# Patient Record
Sex: FEMALE | Race: WHITE | Employment: OTHER | URBAN - METROPOLITAN AREA
[De-identification: names, ages, dates, MRNs, and addresses within clinical notes are randomized per-mention and may not be internally consistent; named-entity substitution may affect disease eponyms.]

---

## 2020-01-24 ENCOUNTER — HOSPITAL ENCOUNTER (OUTPATIENT)
Age: 55
Discharge: HOME | End: 2020-01-24
Payer: COMMERCIAL

## 2020-01-24 VITALS
DIASTOLIC BLOOD PRESSURE: 77 MMHG | BODY MASS INDEX: 19.99 KG/M2 | WEIGHT: 120 LBS | OXYGEN SATURATION: 95 % | HEIGHT: 65 IN | TEMPERATURE: 98 F | HEART RATE: 97 BPM | RESPIRATION RATE: 20 BRPM | SYSTOLIC BLOOD PRESSURE: 119 MMHG

## 2020-01-24 DIAGNOSIS — J02.9 SORE THROAT: Primary | ICD-10-CM

## 2020-01-24 LAB — S PYO AG THROAT QL: NEGATIVE

## 2020-01-24 PROCEDURE — 99204 OFFICE O/P NEW MOD 45 MIN: CPT

## 2020-01-24 PROCEDURE — 87430 STREP A AG IA: CPT

## 2020-01-24 PROCEDURE — 99203 OFFICE O/P NEW LOW 30 MIN: CPT

## 2020-01-24 RX ORDER — AMOXICILLIN 875 MG/1
875 TABLET, COATED ORAL 2 TIMES DAILY
Qty: 20 TABLET | Refills: 0 | Status: SHIPPED | OUTPATIENT
Start: 2020-01-24 | End: 2020-02-03

## 2020-01-24 RX ORDER — DEXAMETHASONE 4 MG/1
10 TABLET ORAL ONCE
Status: COMPLETED | OUTPATIENT
Start: 2020-01-24 | End: 2020-01-24

## 2020-01-24 NOTE — ED PROVIDER NOTES
Patient presents with:  Sore Throat      HPI:     Oliver Parsons is a 53year old female with no significant past medical history presents with a chief complaint of sore throat, cough over the course of the last 3 days. Denies any fever or chills.   No post amoxicillin twice daily x10 days. We discussed supportive care and close follow-up. ER precautions given.   Patient verbalized plan of care and stated understanding      Orders Placed This Encounter      POCT Rapid Strep Once      POCT Rapid Strep      am

## 2020-10-12 ENCOUNTER — APPOINTMENT (OUTPATIENT)
Dept: CT IMAGING | Age: 55
DRG: 101 | End: 2020-10-12
Attending: EMERGENCY MEDICINE

## 2020-10-12 ENCOUNTER — HOSPITAL ENCOUNTER (INPATIENT)
Age: 55
LOS: 1 days | Discharge: HOME | DRG: 101 | End: 2020-10-13
Attending: EMERGENCY MEDICINE | Admitting: INTERNAL MEDICINE

## 2020-10-12 ENCOUNTER — APPOINTMENT (OUTPATIENT)
Dept: MRI IMAGING | Age: 55
DRG: 101 | End: 2020-10-12
Attending: PSYCHIATRY & NEUROLOGY

## 2020-10-12 ENCOUNTER — APPOINTMENT (OUTPATIENT)
Dept: NEUROLOGY | Age: 55
DRG: 101 | End: 2020-10-12
Attending: PSYCHIATRY & NEUROLOGY

## 2020-10-12 ENCOUNTER — APPOINTMENT (OUTPATIENT)
Dept: GENERAL RADIOLOGY | Age: 55
DRG: 101 | End: 2020-10-12
Attending: EMERGENCY MEDICINE

## 2020-10-12 DIAGNOSIS — R29.90 STROKE-LIKE EPISODE: Primary | ICD-10-CM

## 2020-10-12 LAB
ALBUMIN SERPL-MCNC: 3.7 G/DL (ref 3.6–5.1)
ALBUMIN/GLOB SERPL: 1 {RATIO} (ref 1–2.4)
ALP SERPL-CCNC: 94 UNITS/L (ref 45–117)
ALT SERPL-CCNC: 37 UNITS/L
ANION GAP SERPL CALC-SCNC: 13 MMOL/L (ref 10–20)
APTT PPP: 24 SEC (ref 22–32)
AST SERPL-CCNC: 64 UNITS/L
BASOPHILS # BLD: 0.1 K/MCL (ref 0–0.3)
BASOPHILS NFR BLD: 1 %
BILIRUB SERPL-MCNC: 0.5 MG/DL (ref 0.2–1)
BUN SERPL-MCNC: 9 MG/DL (ref 6–20)
BUN/CREAT SERPL: 17 (ref 7–25)
CALCIUM SERPL-MCNC: 8.9 MG/DL (ref 8.4–10.2)
CHLORIDE SERPL-SCNC: 101 MMOL/L (ref 98–107)
CO2 SERPL-SCNC: 26 MMOL/L (ref 21–32)
CREAT SERPL-MCNC: 0.53 MG/DL (ref 0.51–0.95)
DIFFERENTIAL METHOD BLD: ABNORMAL
EOSINOPHIL # BLD: 0 K/MCL (ref 0.1–0.5)
EOSINOPHIL NFR BLD: 1 %
ERYTHROCYTE [DISTWIDTH] IN BLOOD: 12.1 % (ref 11–15)
GLOBULIN SER-MCNC: 3.6 G/DL (ref 2–4)
GLUCOSE BLDC GLUCOMTR-MCNC: 102 MG/DL (ref 70–99)
GLUCOSE SERPL-MCNC: 141 MG/DL (ref 65–99)
HCT VFR BLD CALC: 39.3 % (ref 36–46.5)
HGB BLD-MCNC: 13.4 G/DL (ref 12–15.5)
IMM GRANULOCYTES # BLD AUTO: 0 K/MCL (ref 0–0.2)
IMM GRANULOCYTES NFR BLD: 0 %
INR PPP: 1
LYMPHOCYTES # BLD: 1.4 K/MCL (ref 1–4)
LYMPHOCYTES NFR BLD: 26 %
MCH RBC QN AUTO: 33.5 PG (ref 26–34)
MCHC RBC AUTO-ENTMCNC: 34.1 G/DL (ref 32–36.5)
MCV RBC AUTO: 98.3 FL (ref 78–100)
MONOCYTES # BLD: 0.6 K/MCL (ref 0.3–0.9)
MONOCYTES NFR BLD: 11 %
NEUTROPHILS # BLD: 3.2 K/MCL (ref 1.8–7.7)
NEUTROPHILS NFR BLD: 61 %
NRBC BLD MANUAL-RTO: 0 /100 WBC
PLATELET # BLD: 220 K/MCL (ref 140–450)
POTASSIUM SERPL-SCNC: 3.1 MMOL/L (ref 3.4–5.1)
POTASSIUM SERPL-SCNC: 3.2 MMOL/L (ref 3.4–5.1)
PROT SERPL-MCNC: 7.3 G/DL (ref 6.4–8.2)
PROTHROMBIN TIME: 10.6 SEC (ref 9.7–11.8)
RBC # BLD: 4 MIL/MCL (ref 4–5.2)
SARS-COV-2 RNA RESP QL NAA+PROBE: NOT DETECTED
SERVICE CMNT-IMP: NORMAL
SODIUM SERPL-SCNC: 137 MMOL/L (ref 135–145)
SPECIMEN SOURCE: NORMAL
TROPONIN I SERPL HS-MCNC: <0.02 NG/ML
WBC # BLD: 5.2 K/MCL (ref 4.2–11)

## 2020-10-12 PROCEDURE — 84132 ASSAY OF SERUM POTASSIUM: CPT

## 2020-10-12 PROCEDURE — 85610 PROTHROMBIN TIME: CPT

## 2020-10-12 PROCEDURE — 37195 THROMBOLYTIC THERAPY STROKE: CPT

## 2020-10-12 PROCEDURE — 10002800 HB RX 250 W HCPCS: Performed by: EMERGENCY MEDICINE

## 2020-10-12 PROCEDURE — 95713 VEEG 2-12 HR CONT MNTR: CPT

## 2020-10-12 PROCEDURE — 70450 CT HEAD/BRAIN W/O DYE: CPT

## 2020-10-12 PROCEDURE — 99285 EMERGENCY DEPT VISIT HI MDM: CPT

## 2020-10-12 PROCEDURE — 95700 EEG CONT REC W/VID EEG TECH: CPT

## 2020-10-12 PROCEDURE — 70553 MRI BRAIN STEM W/O & W/DYE: CPT

## 2020-10-12 PROCEDURE — U0003 INFECTIOUS AGENT DETECTION BY NUCLEIC ACID (DNA OR RNA); SEVERE ACUTE RESPIRATORY SYNDROME CORONAVIRUS 2 (SARS-COV-2) (CORONAVIRUS DISEASE [COVID-19]), AMPLIFIED PROBE TECHNIQUE, MAKING USE OF HIGH THROUGHPUT TECHNOLOGIES AS DESCRIBED BY CMS-2020-01-R: HCPCS

## 2020-10-12 PROCEDURE — 85025 COMPLETE CBC W/AUTO DIFF WBC: CPT

## 2020-10-12 PROCEDURE — 10002807 HB RX 258: Performed by: EMERGENCY MEDICINE

## 2020-10-12 PROCEDURE — 36415 COLL VENOUS BLD VENIPUNCTURE: CPT

## 2020-10-12 PROCEDURE — 10000008 HB ROOM CHARGE ICU OR CCU

## 2020-10-12 PROCEDURE — 84484 ASSAY OF TROPONIN QUANT: CPT

## 2020-10-12 PROCEDURE — 70496 CT ANGIOGRAPHY HEAD: CPT

## 2020-10-12 PROCEDURE — A9585 GADOBUTROL INJECTION: HCPCS | Performed by: PSYCHIATRY & NEUROLOGY

## 2020-10-12 PROCEDURE — 92610 EVALUATE SWALLOWING FUNCTION: CPT

## 2020-10-12 PROCEDURE — 80053 COMPREHEN METABOLIC PANEL: CPT

## 2020-10-12 PROCEDURE — 85730 THROMBOPLASTIN TIME PARTIAL: CPT

## 2020-10-12 PROCEDURE — 10002807 HB RX 258: Performed by: PSYCHIATRY & NEUROLOGY

## 2020-10-12 PROCEDURE — 3E03317 INTRODUCTION OF OTHER THROMBOLYTIC INTO PERIPHERAL VEIN, PERCUTANEOUS APPROACH: ICD-10-PCS | Performed by: EMERGENCY MEDICINE

## 2020-10-12 PROCEDURE — 70498 CT ANGIOGRAPHY NECK: CPT

## 2020-10-12 PROCEDURE — 10002805 HB CONTRAST AGENT: Performed by: EMERGENCY MEDICINE

## 2020-10-12 PROCEDURE — 93005 ELECTROCARDIOGRAM TRACING: CPT | Performed by: EMERGENCY MEDICINE

## 2020-10-12 PROCEDURE — 10002800 HB RX 250 W HCPCS: Performed by: PSYCHIATRY & NEUROLOGY

## 2020-10-12 PROCEDURE — 10002805 HB CONTRAST AGENT: Performed by: PSYCHIATRY & NEUROLOGY

## 2020-10-12 PROCEDURE — 71045 X-RAY EXAM CHEST 1 VIEW: CPT

## 2020-10-12 PROCEDURE — 10002803 HB RX 637: Performed by: NURSE PRACTITIONER

## 2020-10-12 PROCEDURE — 4A03X5D MEASUREMENT OF ARTERIAL FLOW, INTRACRANIAL, EXTERNAL APPROACH: ICD-10-PCS | Performed by: RADIOLOGY

## 2020-10-12 RX ORDER — BACILLUS COAGULANS/INULIN 1B-250 MG
CAPSULE ORAL
Status: ON HOLD | COMMUNITY
End: 2020-10-12

## 2020-10-12 RX ORDER — GADOBUTROL 604.72 MG/ML
7.5 INJECTION INTRAVENOUS ONCE
Status: COMPLETED | OUTPATIENT
Start: 2020-10-12 | End: 2020-10-12

## 2020-10-12 RX ORDER — PANTOPRAZOLE SODIUM 40 MG/1
40 TABLET, DELAYED RELEASE ORAL NIGHTLY
Status: DISCONTINUED | OUTPATIENT
Start: 2020-10-12 | End: 2020-10-13 | Stop reason: HOSPADM

## 2020-10-12 RX ORDER — SODIUM CHLORIDE 9 MG/ML
INJECTION, SOLUTION INTRAVENOUS
Status: DISPENSED
Start: 2020-10-12 | End: 2020-10-13

## 2020-10-12 RX ORDER — CHLORAL HYDRATE 500 MG
CAPSULE ORAL
Status: ON HOLD | COMMUNITY
End: 2020-10-12

## 2020-10-12 RX ORDER — LANOLIN ALCOHOL/MO/W.PET/CERES
400 CREAM (GRAM) TOPICAL 3 TIMES DAILY
Status: DISCONTINUED | OUTPATIENT
Start: 2020-10-13 | End: 2020-10-13 | Stop reason: HOSPADM

## 2020-10-12 RX ORDER — POTASSIUM CHLORIDE 20 MEQ/1
40 TABLET, EXTENDED RELEASE ORAL ONCE
Status: COMPLETED | OUTPATIENT
Start: 2020-10-12 | End: 2020-10-12

## 2020-10-12 RX ADMIN — IOHEXOL 100 ML: 350 INJECTION, SOLUTION INTRAVENOUS at 12:00

## 2020-10-12 RX ADMIN — ALTEPLASE 48.6 MG: KIT at 12:21

## 2020-10-12 RX ADMIN — THIAMINE HYDROCHLORIDE 400 MG: 100 INJECTION, SOLUTION INTRAMUSCULAR; INTRAVENOUS at 16:50

## 2020-10-12 RX ADMIN — ALTEPLASE 5.4 MG: KIT at 12:19

## 2020-10-12 RX ADMIN — GADOBUTROL 7.5 ML: 604.72 INJECTION INTRAVENOUS at 18:00

## 2020-10-12 RX ADMIN — PANTOPRAZOLE SODIUM 40 MG: 40 TABLET, DELAYED RELEASE ORAL at 20:30

## 2020-10-12 RX ADMIN — SODIUM CHLORIDE 100 ML: 0.9 INJECTION, SOLUTION INTRAVENOUS at 12:48

## 2020-10-12 RX ADMIN — POTASSIUM CHLORIDE 40 MEQ: 1500 TABLET, EXTENDED RELEASE ORAL at 18:37

## 2020-10-12 ASSESSMENT — COGNITIVE AND FUNCTIONAL STATUS - GENERAL
REMEMBERING WHERE THINGS ARE: A LITTLE
BECAUSE OF A PHYSICAL, MENTAL, OR EMOTIONAL CONDITION, DO YOU HAVE SERIOUS DIFFICULTY CONCENTRATING, REMEMBERING OR MAKING DECISIONS: NO
REMEMBERING TO TAKE MEDICATION: A LITTLE
APPLIED_COGNITIVE_CONVERTED_SCORE: 39.77
ARE YOU DEAF OR DO YOU HAVE SERIOUS DIFFICULTY  HEARING: NO
DO YOU HAVE SERIOUS DIFFICULTY WALKING OR CLIMBING STAIRS: NO
ARE YOU BLIND OR DO YOU HAVE SERIOUS DIFFICULTY SEEING, EVEN WHEN WEARING GLASSES: NO
TAKING CARE OF COMPLICATED TASKS: A LITTLE
DO YOU HAVE DIFFICULTY DRESSING OR BATHING: NO
REMEMBERING 5 ERRANDS WITH NO LIST: A LITTLE
UNDERSTANDING 10 TO 15 MIN SPEECH: A LITTLE
APPLIED_COGNITIVE_RAW_SCORE: 19
BECAUSE OF A PHYSICAL, MENTAL, OR EMOTIONAL CONDITION, DO YOU HAVE DIFFICULTY DOING ERRANDS ALONE: NO

## 2020-10-12 ASSESSMENT — ACTIVITIES OF DAILY LIVING (ADL)
EATING: INDEPENDENT
RECENT_DECLINE_ADL: NO
ADL_BEFORE_ADMISSION: INDEPENDENT
CHRONIC_PAIN_PRESENT: YES, CHRONIC
ADL_SCORE: 12
DESCRIBE HOW PAIN IMPACTS YOUR LIFE: NONE/CAN MANAGE
ADL_SHORT_OF_BREATH: NO

## 2020-10-12 ASSESSMENT — MOVEMENT AND STRENGTH ASSESSMENTS
LLE: EQUAL STRENGTH/TONE/MOVEMENT
LLE: EQUAL STRENGTH/TONE/MOVEMENT
RUE: EQUAL STRENGTH/TONE/MOVEMENT
RUE: EQUAL STRENGTH/TONE/MOVEMENT
FACE_JAW: FACE SYMMETRICAL
FACE_JAW: FACE SYMMETRICAL
LUE: EQUAL STRENGTH/TONE/MOVEMENT
RLE: EQUAL STRENGTH/TONE/MOVEMENT
HEAD_NECK: FULL RANGE OF MOTION
HEAD_NECK: FULL RANGE OF MOTION
LUE: EQUAL STRENGTH/TONE/MOVEMENT
FACE_JAW: FACE SYMMETRICAL
LLE: EQUAL STRENGTH/TONE/MOVEMENT
RUE: EQUAL STRENGTH/TONE/MOVEMENT
LUE: EQUAL STRENGTH/TONE/MOVEMENT
HEAD_NECK: FULL RANGE OF MOTION
RLE: EQUAL STRENGTH/TONE/MOVEMENT
RLE: EQUAL STRENGTH/TONE/MOVEMENT

## 2020-10-12 ASSESSMENT — LIFESTYLE VARIABLES
AUDIT-C TOTAL SCORE: 5
HOW MANY STANDARD DRINKS CONTAINING ALCOHOL DO YOU HAVE ON A TYPICAL DAY: 0,1 OR 2
HOW OFTEN DO YOU HAVE A DRINK CONTAINING ALCOHOL: 4 OR MORE TIMES PER WEEK
HOW OFTEN DO YOU HAVE 6 OR MORE DRINKS ON ONE OCCASION: LESS THAN MONTHLY

## 2020-10-12 ASSESSMENT — ENCOUNTER SYMPTOMS
COUGH: 0
ADENOPATHY: 0
NAUSEA: 0
LIGHT-HEADEDNESS: 0
DIARRHEA: 0
CHILLS: 0
ABDOMINAL PAIN: 0
DIZZINESS: 0
BACK PAIN: 0
SORE THROAT: 0
VOMITING: 0
SPEECH DIFFICULTY: 1
FEVER: 0
SHORTNESS OF BREATH: 0

## 2020-10-12 ASSESSMENT — COLUMBIA-SUICIDE SEVERITY RATING SCALE - C-SSRS
IS THE PATIENT ABLE TO COMPLETE C-SSRS: YES
1. WITHIN THE PAST MONTH, HAVE YOU WISHED YOU WERE DEAD OR WISHED YOU COULD GO TO SLEEP AND NOT WAKE UP?: NO
6. HAVE YOU EVER DONE ANYTHING, STARTED TO DO ANYTHING, OR PREPARED TO DO ANYTHING TO END YOUR LIFE?: NO
2. HAVE YOU ACTUALLY HAD ANY THOUGHTS OF KILLING YOURSELF?: NO

## 2020-10-12 ASSESSMENT — PAIN SCALES - GENERAL: PAINLEVEL_OUTOF10: 0

## 2020-10-13 ENCOUNTER — APPOINTMENT (OUTPATIENT)
Dept: CT IMAGING | Age: 55
DRG: 101 | End: 2020-10-13
Attending: PSYCHIATRY & NEUROLOGY

## 2020-10-13 VITALS
OXYGEN SATURATION: 99 % | SYSTOLIC BLOOD PRESSURE: 143 MMHG | BODY MASS INDEX: 20.09 KG/M2 | WEIGHT: 125 LBS | TEMPERATURE: 98.6 F | HEIGHT: 66 IN | RESPIRATION RATE: 26 BRPM | DIASTOLIC BLOOD PRESSURE: 84 MMHG | HEART RATE: 89 BPM

## 2020-10-13 LAB
ANION GAP SERPL CALC-SCNC: 10 MMOL/L (ref 10–20)
ATRIAL RATE (BPM): 82
BUN SERPL-MCNC: 8 MG/DL (ref 6–20)
BUN/CREAT SERPL: 14 (ref 7–25)
CALCIUM SERPL-MCNC: 8.9 MG/DL (ref 8.4–10.2)
CHLORIDE SERPL-SCNC: 104 MMOL/L (ref 98–107)
CHOLEST SERPL-MCNC: 287 MG/DL
CHOLEST/HDLC SERPL: 2.9 {RATIO}
CO2 SERPL-SCNC: 29 MMOL/L (ref 21–32)
CREAT SERPL-MCNC: 0.58 MG/DL (ref 0.51–0.95)
ERYTHROCYTE [DISTWIDTH] IN BLOOD: 12.2 % (ref 11–15)
GLUCOSE BLDC GLUCOMTR-MCNC: 92 MG/DL (ref 70–99)
GLUCOSE SERPL-MCNC: 87 MG/DL (ref 65–99)
HCT VFR BLD CALC: 42.1 % (ref 36–46.5)
HDLC SERPL-MCNC: 98 MG/DL
HGB BLD-MCNC: 14 G/DL (ref 12–15.5)
LDLC SERPL CALC-MCNC: 175 MG/DL
MAGNESIUM SERPL-MCNC: 1.8 MG/DL (ref 1.7–2.4)
MCH RBC QN AUTO: 33.6 PG (ref 26–34)
MCHC RBC AUTO-ENTMCNC: 33.3 G/DL (ref 32–36.5)
MCV RBC AUTO: 101 FL (ref 78–100)
NONHDLC SERPL-MCNC: 189 MG/DL
NRBC BLD MANUAL-RTO: 0 /100 WBC
P AXIS (DEGREES): 55
PLATELET # BLD: 198 K/MCL (ref 140–450)
POTASSIUM SERPL-SCNC: 3.9 MMOL/L (ref 3.4–5.1)
PR-INTERVAL (MSEC): 138
QRS-INTERVAL (MSEC): 82
QT-INTERVAL (MSEC): 416
QTC: 486
R AXIS (DEGREES): -11
RBC # BLD: 4.17 MIL/MCL (ref 4–5.2)
REPORT TEXT: NORMAL
SODIUM SERPL-SCNC: 139 MMOL/L (ref 135–145)
T AXIS (DEGREES): 25
TRIGL SERPL-MCNC: 72 MG/DL
VENTRICULAR RATE EKG/MIN (BPM): 82
WBC # BLD: 5 K/MCL (ref 4.2–11)

## 2020-10-13 PROCEDURE — 99254 IP/OBS CNSLTJ NEW/EST MOD 60: CPT | Performed by: FAMILY MEDICINE

## 2020-10-13 PROCEDURE — 80048 BASIC METABOLIC PNL TOTAL CA: CPT

## 2020-10-13 PROCEDURE — 70450 CT HEAD/BRAIN W/O DYE: CPT

## 2020-10-13 PROCEDURE — 10002803 HB RX 637: Performed by: EMERGENCY MEDICINE

## 2020-10-13 PROCEDURE — 10002800 HB RX 250 W HCPCS: Performed by: INTERNAL MEDICINE

## 2020-10-13 PROCEDURE — 10002803 HB RX 637: Performed by: PSYCHIATRY & NEUROLOGY

## 2020-10-13 PROCEDURE — 90686 IIV4 VACC NO PRSV 0.5 ML IM: CPT | Performed by: INTERNAL MEDICINE

## 2020-10-13 PROCEDURE — 97163 PT EVAL HIGH COMPLEX 45 MIN: CPT

## 2020-10-13 PROCEDURE — 85027 COMPLETE CBC AUTOMATED: CPT

## 2020-10-13 PROCEDURE — 83735 ASSAY OF MAGNESIUM: CPT

## 2020-10-13 PROCEDURE — 97166 OT EVAL MOD COMPLEX 45 MIN: CPT

## 2020-10-13 PROCEDURE — 36415 COLL VENOUS BLD VENIPUNCTURE: CPT

## 2020-10-13 PROCEDURE — 80061 LIPID PANEL: CPT

## 2020-10-13 PROCEDURE — 10002803 HB RX 637: Performed by: INTERNAL MEDICINE

## 2020-10-13 RX ORDER — POTASSIUM CHLORIDE 20 MEQ/1
40 TABLET, EXTENDED RELEASE ORAL ONCE
Status: COMPLETED | OUTPATIENT
Start: 2020-10-13 | End: 2020-10-13

## 2020-10-13 RX ORDER — LEVETIRACETAM 500 MG/1
500 TABLET ORAL EVERY 12 HOURS SCHEDULED
Qty: 90 TABLET | Refills: 1 | Status: SHIPPED | OUTPATIENT
Start: 2020-10-13 | End: 2022-02-02 | Stop reason: DRUGHIGH

## 2020-10-13 RX ORDER — LEVETIRACETAM 500 MG/1
500 TABLET ORAL EVERY 12 HOURS SCHEDULED
Status: DISCONTINUED | OUTPATIENT
Start: 2020-10-13 | End: 2020-10-13 | Stop reason: HOSPADM

## 2020-10-13 RX ADMIN — INFLUENZA A VIRUS A/GUANGDONG-MAONAN/SWL1536/2019 CNIC-1909 (H1N1) ANTIGEN (FORMALDEHYDE INACTIVATED), INFLUENZA A VIRUS A/HONG KONG/2671/2019 (H3N2) ANTIGEN (FORMALDEHYDE INACTIVATED), INFLUENZA B VIRUS B/PHUKET/3073/2013 ANTIGEN (FORMALDEHYDE INACTIVATED), AND INFLUENZA B VIRUS B/WASHINGTON/02/2019 ANTIGEN (FORMALDEHYDE INACTIVATED) 0.5 ML: 15; 15; 15; 15 INJECTION, SUSPENSION INTRAMUSCULAR at 12:50

## 2020-10-13 RX ADMIN — POTASSIUM CHLORIDE 40 MEQ: 1500 TABLET, EXTENDED RELEASE ORAL at 01:19

## 2020-10-13 RX ADMIN — Medication 400 MG: at 13:00

## 2020-10-13 RX ADMIN — Medication 400 MG: at 08:19

## 2020-10-13 RX ADMIN — LEVETIRACETAM 500 MG: 500 TABLET ORAL at 12:50

## 2020-10-13 RX ADMIN — POTASSIUM CHLORIDE 40 MEQ: 1500 TABLET, EXTENDED RELEASE ORAL at 08:19

## 2020-10-13 ASSESSMENT — COGNITIVE AND FUNCTIONAL STATUS - GENERAL
DAILY_ACTIVITY_RAW_SCORE: 24
DAILY_ACTIVITY_CONVERTED_SCORE: 57.54
BASIC_MOBILITY_RAW_SCORE: 20
BASIC_MOBILITY_CONVERTED_SCORE: 43.99

## 2020-10-13 ASSESSMENT — PAIN SCALES - GENERAL
PAINLEVEL_OUTOF10: 0
PAINLEVEL_OUTOF10: 2
PAINLEVEL_OUTOF10: 0

## 2020-10-13 ASSESSMENT — ACTIVITIES OF DAILY LIVING (ADL): PRIOR_ADL: INDEPENDENT

## 2022-02-02 ENCOUNTER — HOSPITAL ENCOUNTER (EMERGENCY)
Age: 57
Discharge: HOME | End: 2022-02-02
Attending: EMERGENCY MEDICINE

## 2022-02-02 ENCOUNTER — APPOINTMENT (OUTPATIENT)
Dept: GENERAL RADIOLOGY | Age: 57
End: 2022-02-02
Attending: EMERGENCY MEDICINE

## 2022-02-02 ENCOUNTER — APPOINTMENT (OUTPATIENT)
Dept: CT IMAGING | Age: 57
End: 2022-02-02
Attending: EMERGENCY MEDICINE

## 2022-02-02 ENCOUNTER — APPOINTMENT (OUTPATIENT)
Dept: NEUROLOGY | Age: 57
End: 2022-02-02
Attending: PSYCHIATRY & NEUROLOGY

## 2022-02-02 VITALS
DIASTOLIC BLOOD PRESSURE: 85 MMHG | BODY MASS INDEX: 20.92 KG/M2 | OXYGEN SATURATION: 99 % | WEIGHT: 129.63 LBS | RESPIRATION RATE: 15 BRPM | HEART RATE: 92 BPM | TEMPERATURE: 98.6 F | SYSTOLIC BLOOD PRESSURE: 132 MMHG

## 2022-02-02 DIAGNOSIS — R56.9 SEIZURE (CMD): Primary | ICD-10-CM

## 2022-02-02 LAB
ALBUMIN SERPL-MCNC: 3.8 G/DL (ref 3.6–5.1)
ALP SERPL-CCNC: 93 UNITS/L (ref 45–117)
ALT SERPL-CCNC: 64 UNITS/L
ANION GAP SERPL CALC-SCNC: 14 MMOL/L (ref 10–20)
APTT PPP: 22 SEC (ref 22–30)
AST SERPL-CCNC: 115 UNITS/L
ATRIAL RATE (BPM): 93
BASOPHILS # BLD: 0 K/MCL (ref 0–0.3)
BASOPHILS NFR BLD: 1 %
BILIRUB CONJ SERPL-MCNC: 0.2 MG/DL (ref 0–0.2)
BILIRUB SERPL-MCNC: 0.9 MG/DL (ref 0.2–1)
BUN SERPL-MCNC: 11 MG/DL (ref 6–20)
BUN/CREAT SERPL: 22 (ref 7–25)
CALCIUM SERPL-MCNC: 9.8 MG/DL (ref 8.4–10.2)
CHLORIDE SERPL-SCNC: 102 MMOL/L (ref 98–107)
CO2 SERPL-SCNC: 27 MMOL/L (ref 21–32)
CREAT SERPL-MCNC: 0.51 MG/DL (ref 0.51–0.95)
DEPRECATED RDW RBC: 51.1 FL (ref 39–50)
EOSINOPHIL # BLD: 0 K/MCL (ref 0–0.5)
EOSINOPHIL NFR BLD: 1 %
ERYTHROCYTE [DISTWIDTH] IN BLOOD: 13.5 % (ref 11–15)
FASTING DURATION TIME PATIENT: ABNORMAL H
FLUAV RNA RESP QL NAA+PROBE: NOT DETECTED
FLUBV RNA RESP QL NAA+PROBE: NOT DETECTED
GFR SERPLBLD BASED ON 1.73 SQ M-ARVRAT: >90 ML/MIN
GLUCOSE BLDC GLUCOMTR-MCNC: 119 MG/DL (ref 70–99)
GLUCOSE SERPL-MCNC: 113 MG/DL (ref 70–99)
HCT VFR BLD CALC: 43.6 % (ref 36–46.5)
HGB BLD-MCNC: 14.6 G/DL (ref 12–15.5)
IMM GRANULOCYTES # BLD AUTO: 0 K/MCL (ref 0–0.2)
IMM GRANULOCYTES # BLD: 0 %
INR PPP: 1.1
LIPASE SERPL-CCNC: 205 UNITS/L (ref 73–393)
LYMPHOCYTES # BLD: 1 K/MCL (ref 1–4)
LYMPHOCYTES NFR BLD: 22 %
MCH RBC QN AUTO: 34.1 PG (ref 26–34)
MCHC RBC AUTO-ENTMCNC: 33.5 G/DL (ref 32–36.5)
MCV RBC AUTO: 101.9 FL (ref 78–100)
MONOCYTES # BLD: 0.5 K/MCL (ref 0.3–0.9)
MONOCYTES NFR BLD: 11 %
NEUTROPHILS # BLD: 3 K/MCL (ref 1.8–7.7)
NEUTROPHILS NFR BLD: 65 %
NRBC BLD MANUAL-RTO: 0 /100 WBC
P AXIS (DEGREES): 50
PLATELET # BLD AUTO: 143 K/MCL (ref 140–450)
POTASSIUM SERPL-SCNC: 4.4 MMOL/L (ref 3.4–5.1)
PR-INTERVAL (MSEC): 136
PROT SERPL-MCNC: 8.3 G/DL (ref 6.4–8.2)
PROTHROMBIN TIME: 11.4 SEC (ref 9.7–11.8)
QRS-INTERVAL (MSEC): 80
QT-INTERVAL (MSEC): 368
QTC: 458
R AXIS (DEGREES): -5
RAINBOW EXTRA TUBES HOLD SPECIMEN: NORMAL
RBC # BLD: 4.28 MIL/MCL (ref 4–5.2)
REPORT TEXT: NORMAL
RSV AG NPH QL IA.RAPID: NOT DETECTED
SARS-COV-2 RNA RESP QL NAA+PROBE: NOT DETECTED
SERVICE CMNT-IMP: NORMAL
SERVICE CMNT-IMP: NORMAL
SODIUM SERPL-SCNC: 139 MMOL/L (ref 135–145)
T AXIS (DEGREES): 28
TROPONIN I SERPL DL<=0.01 NG/ML-MCNC: 5 NG/L
VENTRICULAR RATE EKG/MIN (BPM): 93
WBC # BLD: 4.6 K/MCL (ref 4.2–11)

## 2022-02-02 PROCEDURE — 93005 ELECTROCARDIOGRAM TRACING: CPT | Performed by: EMERGENCY MEDICINE

## 2022-02-02 PROCEDURE — 80048 BASIC METABOLIC PNL TOTAL CA: CPT | Performed by: EMERGENCY MEDICINE

## 2022-02-02 PROCEDURE — 85025 COMPLETE CBC W/AUTO DIFF WBC: CPT | Performed by: EMERGENCY MEDICINE

## 2022-02-02 PROCEDURE — C9803 HOPD COVID-19 SPEC COLLECT: HCPCS

## 2022-02-02 PROCEDURE — 99285 EMERGENCY DEPT VISIT HI MDM: CPT

## 2022-02-02 PROCEDURE — 85730 THROMBOPLASTIN TIME PARTIAL: CPT | Performed by: EMERGENCY MEDICINE

## 2022-02-02 PROCEDURE — 84484 ASSAY OF TROPONIN QUANT: CPT | Performed by: EMERGENCY MEDICINE

## 2022-02-02 PROCEDURE — 95816 EEG AWAKE AND DROWSY: CPT

## 2022-02-02 PROCEDURE — 71045 X-RAY EXAM CHEST 1 VIEW: CPT

## 2022-02-02 PROCEDURE — 96375 TX/PRO/DX INJ NEW DRUG ADDON: CPT

## 2022-02-02 PROCEDURE — 85610 PROTHROMBIN TIME: CPT | Performed by: EMERGENCY MEDICINE

## 2022-02-02 PROCEDURE — 80076 HEPATIC FUNCTION PANEL: CPT | Performed by: EMERGENCY MEDICINE

## 2022-02-02 PROCEDURE — 70450 CT HEAD/BRAIN W/O DYE: CPT

## 2022-02-02 PROCEDURE — 10002800 HB RX 250 W HCPCS: Performed by: EMERGENCY MEDICINE

## 2022-02-02 PROCEDURE — 82962 GLUCOSE BLOOD TEST: CPT

## 2022-02-02 PROCEDURE — 83690 ASSAY OF LIPASE: CPT | Performed by: EMERGENCY MEDICINE

## 2022-02-02 PROCEDURE — 96374 THER/PROPH/DIAG INJ IV PUSH: CPT

## 2022-02-02 PROCEDURE — 0241U COVID/FLU/RSV PANEL: CPT | Performed by: EMERGENCY MEDICINE

## 2022-02-02 RX ORDER — LEVETIRACETAM 1000 MG/1
1000 TABLET ORAL 2 TIMES DAILY
Qty: 180 TABLET | Refills: 3 | Status: SHIPPED | OUTPATIENT
Start: 2022-02-02

## 2022-02-02 RX ORDER — LEVETIRACETAM 500 MG/5ML
1000 INJECTION, SOLUTION, CONCENTRATE INTRAVENOUS ONCE
Status: COMPLETED | OUTPATIENT
Start: 2022-02-02 | End: 2022-02-02

## 2022-02-02 RX ORDER — LORAZEPAM 2 MG/ML
2 INJECTION INTRAMUSCULAR ONCE
Status: COMPLETED | OUTPATIENT
Start: 2022-02-02 | End: 2022-02-02

## 2022-02-02 RX ADMIN — LEVETIRACETAM 1000 MG: 100 INJECTION, SOLUTION INTRAVENOUS at 12:54

## 2022-02-02 RX ADMIN — LORAZEPAM 2 MG: 2 INJECTION INTRAMUSCULAR; INTRAVENOUS at 12:36

## 2022-02-02 ASSESSMENT — ENCOUNTER SYMPTOMS
NAUSEA: 0
NUMBNESS: 0
SPEECH DIFFICULTY: 1
DIARRHEA: 0
SHORTNESS OF BREATH: 0
HEADACHES: 0
WEAKNESS: 0
ABDOMINAL PAIN: 0
VOMITING: 0
CHILLS: 0
COUGH: 0
WHEEZING: 0
HALLUCINATIONS: 0
SORE THROAT: 0
DIZZINESS: 0
CONFUSION: 1
TREMORS: 1
EYE PAIN: 0
FEVER: 0

## 2022-12-28 ENCOUNTER — APPOINTMENT (OUTPATIENT)
Dept: GENERAL RADIOLOGY | Age: 57
DRG: 872 | End: 2022-12-28
Attending: EMERGENCY MEDICINE

## 2022-12-28 ENCOUNTER — HOSPITAL ENCOUNTER (INPATIENT)
Age: 57
LOS: 2 days | Discharge: HOME | DRG: 872 | End: 2022-12-30
Attending: EMERGENCY MEDICINE | Admitting: FAMILY MEDICINE

## 2022-12-28 ENCOUNTER — APPOINTMENT (OUTPATIENT)
Dept: CT IMAGING | Age: 57
DRG: 872 | End: 2022-12-28
Attending: EMERGENCY MEDICINE

## 2022-12-28 DIAGNOSIS — A41.9 SEPSIS WITHOUT ACUTE ORGAN DYSFUNCTION, DUE TO UNSPECIFIED ORGANISM (CMD): Primary | ICD-10-CM

## 2022-12-28 DIAGNOSIS — N39.0 URINARY TRACT INFECTION WITHOUT HEMATURIA, SITE UNSPECIFIED: ICD-10-CM

## 2022-12-28 DIAGNOSIS — G40.909 SEIZURE DISORDER (CMD): ICD-10-CM

## 2022-12-28 LAB
ALBUMIN SERPL-MCNC: 3.6 G/DL (ref 3.6–5.1)
ALBUMIN/GLOB SERPL: 0.6 {RATIO} (ref 1–2.4)
ALP SERPL-CCNC: 142 UNITS/L (ref 45–117)
ALT SERPL-CCNC: 19 UNITS/L
ANION GAP SERPL CALC-SCNC: 19 MMOL/L (ref 7–19)
APPEARANCE UR: ABNORMAL
APTT PPP: 30 SEC (ref 22–30)
AST SERPL-CCNC: 27 UNITS/L
ATRIAL RATE (BPM): 102
BACTERIA #/AREA URNS HPF: ABNORMAL /HPF
BASOPHILS # BLD: 0.1 K/MCL (ref 0–0.3)
BASOPHILS NFR BLD: 1 %
BILIRUB SERPL-MCNC: 1.4 MG/DL (ref 0.2–1)
BILIRUB UR QL STRIP: NEGATIVE
BUN SERPL-MCNC: 22 MG/DL (ref 6–20)
BUN/CREAT SERPL: 26 (ref 7–25)
CALCIUM SERPL-MCNC: 9.7 MG/DL (ref 8.4–10.2)
CHLORIDE SERPL-SCNC: 86 MMOL/L (ref 97–110)
CO2 SERPL-SCNC: 30 MMOL/L (ref 21–32)
COLOR UR: YELLOW
CREAT SERPL-MCNC: 0.86 MG/DL (ref 0.51–0.95)
DEPRECATED RDW RBC: 45.3 FL (ref 39–50)
EOSINOPHIL # BLD: 0.5 K/MCL (ref 0–0.5)
EOSINOPHIL NFR BLD: 5 %
ERYTHROCYTE [DISTWIDTH] IN BLOOD: 12.6 % (ref 11–15)
FASTING DURATION TIME PATIENT: ABNORMAL H
FLUAV RNA RESP QL NAA+PROBE: NOT DETECTED
FLUBV RNA RESP QL NAA+PROBE: NOT DETECTED
GFR SERPLBLD BASED ON 1.73 SQ M-ARVRAT: 78 ML/MIN
GLOBULIN SER-MCNC: 5.6 G/DL (ref 2–4)
GLUCOSE BLDC GLUCOMTR-MCNC: 150 MG/DL (ref 70–99)
GLUCOSE SERPL-MCNC: 151 MG/DL (ref 70–99)
GLUCOSE UR STRIP-MCNC: NEGATIVE MG/DL
HCT VFR BLD CALC: 45.6 % (ref 36–46.5)
HGB BLD-MCNC: 15.4 G/DL (ref 12–15.5)
HGB UR QL STRIP: ABNORMAL
HYALINE CASTS #/AREA URNS LPF: ABNORMAL /LPF
IMM GRANULOCYTES # BLD AUTO: 0 K/MCL (ref 0–0.2)
IMM GRANULOCYTES # BLD: 0 %
INR PPP: 1.1
KETONES UR STRIP-MCNC: 15 MG/DL
LACTATE BLDV-SCNC: 1.3 MMOL/L (ref 0–2)
LACTATE BLDV-SCNC: 4 MMOL/L (ref 0–2)
LEUKOCYTE ESTERASE UR QL STRIP: ABNORMAL
LIPASE SERPL-CCNC: 206 UNITS/L (ref 73–393)
LYMPHOCYTES # BLD: 1 K/MCL (ref 1–4)
LYMPHOCYTES NFR BLD: 9 %
MCH RBC QN AUTO: 32.6 PG (ref 26–34)
MCHC RBC AUTO-ENTMCNC: 33.8 G/DL (ref 32–36.5)
MCV RBC AUTO: 96.4 FL (ref 78–100)
MONOCYTES # BLD: 0.8 K/MCL (ref 0.3–0.9)
MONOCYTES NFR BLD: 7 %
NEUTROPHILS # BLD: 8.8 K/MCL (ref 1.8–7.7)
NEUTROPHILS NFR BLD: 78 %
NITRITE UR QL STRIP: NEGATIVE
NRBC BLD MANUAL-RTO: 0 /100 WBC
P AXIS (DEGREES): 57
PH UR STRIP: 6 [PH] (ref 5–7)
PLATELET # BLD AUTO: 144 K/MCL (ref 140–450)
POTASSIUM SERPL-SCNC: 3.5 MMOL/L (ref 3.4–5.1)
POTASSIUM SERPL-SCNC: 3.5 MMOL/L (ref 3.4–5.1)
PR-INTERVAL (MSEC): 128
PROCALCITONIN SERPL IA-MCNC: 2.85 NG/ML
PROT SERPL-MCNC: 9.2 G/DL (ref 6.4–8.2)
PROT UR STRIP-MCNC: 300 MG/DL
PROTHROMBIN TIME: 10.9 SEC (ref 9.7–11.8)
QRS-INTERVAL (MSEC): 82
QT-INTERVAL (MSEC): 344
QTC: 448
R AXIS (DEGREES): 4
RAINBOW EXTRA TUBES HOLD SPECIMEN: NORMAL
RAINBOW EXTRA TUBES HOLD SPECIMEN: NORMAL
RBC # BLD: 4.73 MIL/MCL (ref 4–5.2)
RBC #/AREA URNS HPF: ABNORMAL /HPF
REPORT TEXT: NORMAL
RSV AG NPH QL IA.RAPID: NOT DETECTED
SARS-COV-2 RNA RESP QL NAA+PROBE: NOT DETECTED
SERVICE CMNT-IMP: NORMAL
SERVICE CMNT-IMP: NORMAL
SODIUM SERPL-SCNC: 131 MMOL/L (ref 135–145)
SP GR UR STRIP: 1.02 (ref 1–1.03)
SQUAMOUS #/AREA URNS HPF: ABNORMAL /HPF
T AXIS (DEGREES): 45
UROBILINOGEN UR STRIP-MCNC: 4 MG/DL
VENTRICULAR RATE EKG/MIN (BPM): 102
WBC # BLD: 11.1 K/MCL (ref 4.2–11)
WBC #/AREA URNS HPF: >100 /HPF

## 2022-12-28 PROCEDURE — 87077 CULTURE AEROBIC IDENTIFY: CPT | Performed by: EMERGENCY MEDICINE

## 2022-12-28 PROCEDURE — 0241U COVID/FLU/RSV PANEL: CPT | Performed by: EMERGENCY MEDICINE

## 2022-12-28 PROCEDURE — 83690 ASSAY OF LIPASE: CPT | Performed by: EMERGENCY MEDICINE

## 2022-12-28 PROCEDURE — 99285 EMERGENCY DEPT VISIT HI MDM: CPT

## 2022-12-28 PROCEDURE — 36415 COLL VENOUS BLD VENIPUNCTURE: CPT | Performed by: FAMILY MEDICINE

## 2022-12-28 PROCEDURE — 10002805 HB CONTRAST AGENT: Performed by: EMERGENCY MEDICINE

## 2022-12-28 PROCEDURE — 10000002 HB ROOM CHARGE MED SURG

## 2022-12-28 PROCEDURE — 84132 ASSAY OF SERUM POTASSIUM: CPT | Performed by: FAMILY MEDICINE

## 2022-12-28 PROCEDURE — 87186 SC STD MICRODIL/AGAR DIL: CPT | Performed by: EMERGENCY MEDICINE

## 2022-12-28 PROCEDURE — 10002800 HB RX 250 W HCPCS: Performed by: EMERGENCY MEDICINE

## 2022-12-28 PROCEDURE — 10002807 HB RX 258: Performed by: FAMILY MEDICINE

## 2022-12-28 PROCEDURE — 80053 COMPREHEN METABOLIC PANEL: CPT | Performed by: EMERGENCY MEDICINE

## 2022-12-28 PROCEDURE — 10002807 HB RX 258: Performed by: EMERGENCY MEDICINE

## 2022-12-28 PROCEDURE — 84145 PROCALCITONIN (PCT): CPT | Performed by: EMERGENCY MEDICINE

## 2022-12-28 PROCEDURE — 82962 GLUCOSE BLOOD TEST: CPT

## 2022-12-28 PROCEDURE — 71045 X-RAY EXAM CHEST 1 VIEW: CPT

## 2022-12-28 PROCEDURE — 93005 ELECTROCARDIOGRAM TRACING: CPT | Performed by: EMERGENCY MEDICINE

## 2022-12-28 PROCEDURE — 96361 HYDRATE IV INFUSION ADD-ON: CPT

## 2022-12-28 PROCEDURE — 83605 ASSAY OF LACTIC ACID: CPT | Performed by: EMERGENCY MEDICINE

## 2022-12-28 PROCEDURE — 99223 1ST HOSP IP/OBS HIGH 75: CPT | Performed by: FAMILY MEDICINE

## 2022-12-28 PROCEDURE — 10002801 HB RX 250 W/O HCPCS: Performed by: EMERGENCY MEDICINE

## 2022-12-28 PROCEDURE — G1004 CDSM NDSC: HCPCS

## 2022-12-28 PROCEDURE — C9803 HOPD COVID-19 SPEC COLLECT: HCPCS

## 2022-12-28 PROCEDURE — 87154 CUL TYP ID BLD PTHGN 6+ TRGT: CPT | Performed by: EMERGENCY MEDICINE

## 2022-12-28 PROCEDURE — 74177 CT ABD & PELVIS W/CONTRAST: CPT

## 2022-12-28 PROCEDURE — 85730 THROMBOPLASTIN TIME PARTIAL: CPT | Performed by: EMERGENCY MEDICINE

## 2022-12-28 PROCEDURE — 10002803 HB RX 637: Performed by: FAMILY MEDICINE

## 2022-12-28 PROCEDURE — 85610 PROTHROMBIN TIME: CPT | Performed by: EMERGENCY MEDICINE

## 2022-12-28 PROCEDURE — 85025 COMPLETE CBC W/AUTO DIFF WBC: CPT | Performed by: EMERGENCY MEDICINE

## 2022-12-28 PROCEDURE — 81001 URINALYSIS AUTO W/SCOPE: CPT | Performed by: EMERGENCY MEDICINE

## 2022-12-28 PROCEDURE — 10004651 HB RX, NO CHARGE ITEM: Performed by: EMERGENCY MEDICINE

## 2022-12-28 PROCEDURE — 96365 THER/PROPH/DIAG IV INF INIT: CPT

## 2022-12-28 PROCEDURE — 96375 TX/PRO/DX INJ NEW DRUG ADDON: CPT

## 2022-12-28 PROCEDURE — 10004651 HB RX, NO CHARGE ITEM: Performed by: FAMILY MEDICINE

## 2022-12-28 RX ORDER — ATORVASTATIN CALCIUM 20 MG/1
20 TABLET, FILM COATED ORAL AT BEDTIME
Status: DISCONTINUED | OUTPATIENT
Start: 2022-12-28 | End: 2022-12-30 | Stop reason: HOSPADM

## 2022-12-28 RX ORDER — ACETAMINOPHEN 325 MG/1
650 TABLET ORAL EVERY 4 HOURS PRN
Status: DISCONTINUED | OUTPATIENT
Start: 2022-12-28 | End: 2022-12-30 | Stop reason: HOSPADM

## 2022-12-28 RX ORDER — POTASSIUM CHLORIDE 20 MEQ/1
40 TABLET, EXTENDED RELEASE ORAL ONCE
Status: COMPLETED | OUTPATIENT
Start: 2022-12-29 | End: 2022-12-28

## 2022-12-28 RX ORDER — 0.9 % SODIUM CHLORIDE 0.9 %
2 VIAL (ML) INJECTION EVERY 12 HOURS SCHEDULED
Status: DISCONTINUED | OUTPATIENT
Start: 2022-12-28 | End: 2022-12-30 | Stop reason: HOSPADM

## 2022-12-28 RX ORDER — LEVETIRACETAM 500 MG/5ML
1000 INJECTION, SOLUTION, CONCENTRATE INTRAVENOUS ONCE
Status: COMPLETED | OUTPATIENT
Start: 2022-12-28 | End: 2022-12-28

## 2022-12-28 RX ORDER — ATORVASTATIN CALCIUM 20 MG/1
20 TABLET, FILM COATED ORAL AT BEDTIME
COMMUNITY

## 2022-12-28 RX ORDER — CEFAZOLIN SODIUM/WATER 2 G/20 ML
2000 SYRINGE (ML) INTRAVENOUS ONCE
Status: COMPLETED | OUTPATIENT
Start: 2022-12-28 | End: 2022-12-28

## 2022-12-28 RX ORDER — LEVETIRACETAM 500 MG/1
1000 TABLET ORAL 2 TIMES DAILY
Status: DISCONTINUED | OUTPATIENT
Start: 2022-12-28 | End: 2022-12-30 | Stop reason: HOSPADM

## 2022-12-28 RX ORDER — SODIUM CHLORIDE 9 MG/ML
INJECTION, SOLUTION INTRAVENOUS CONTINUOUS
Status: DISCONTINUED | OUTPATIENT
Start: 2022-12-28 | End: 2022-12-30 | Stop reason: HOSPADM

## 2022-12-28 RX ORDER — CEFAZOLIN SODIUM/WATER 2 G/20 ML
2000 SYRINGE (ML) INTRAVENOUS DAILY
Status: DISCONTINUED | OUTPATIENT
Start: 2022-12-29 | End: 2022-12-28 | Stop reason: DRUGHIGH

## 2022-12-28 RX ORDER — ONDANSETRON 2 MG/ML
4 INJECTION INTRAMUSCULAR; INTRAVENOUS 2 TIMES DAILY PRN
Status: DISCONTINUED | OUTPATIENT
Start: 2022-12-28 | End: 2022-12-30 | Stop reason: HOSPADM

## 2022-12-28 RX ORDER — CEFAZOLIN SODIUM/WATER 1 G/10 ML
1000 SYRINGE (ML) INTRAVENOUS DAILY
Status: DISCONTINUED | OUTPATIENT
Start: 2022-12-29 | End: 2022-12-30 | Stop reason: HOSPADM

## 2022-12-28 RX ORDER — HYDROCODONE BITARTRATE AND ACETAMINOPHEN 5; 325 MG/1; MG/1
1 TABLET ORAL EVERY 4 HOURS PRN
Status: DISCONTINUED | OUTPATIENT
Start: 2022-12-28 | End: 2022-12-30 | Stop reason: HOSPADM

## 2022-12-28 RX ORDER — FOLIC ACID 0.8 MG
500 TABLET ORAL DAILY
COMMUNITY

## 2022-12-28 RX ORDER — POTASSIUM CHLORIDE 20 MEQ/1
40 TABLET, EXTENDED RELEASE ORAL ONCE
Status: COMPLETED | OUTPATIENT
Start: 2022-12-28 | End: 2022-12-28

## 2022-12-28 RX ORDER — ACETAMINOPHEN 500 MG
1000 TABLET ORAL ONCE
Status: COMPLETED | OUTPATIENT
Start: 2022-12-28 | End: 2022-12-28

## 2022-12-28 RX ORDER — ONDANSETRON 2 MG/ML
4 INJECTION INTRAMUSCULAR; INTRAVENOUS ONCE
Status: COMPLETED | OUTPATIENT
Start: 2022-12-28 | End: 2022-12-28

## 2022-12-28 RX ADMIN — POTASSIUM CHLORIDE 40 MEQ: 1500 TABLET, EXTENDED RELEASE ORAL at 23:58

## 2022-12-28 RX ADMIN — SODIUM CHLORIDE 25 ML: 9 INJECTION, SOLUTION INTRAVENOUS at 11:13

## 2022-12-28 RX ADMIN — ONDANSETRON 4 MG: 2 INJECTION INTRAMUSCULAR; INTRAVENOUS at 14:54

## 2022-12-28 RX ADMIN — METRONIDAZOLE 500 MG: 500 INJECTION, SOLUTION INTRAVENOUS at 21:29

## 2022-12-28 RX ADMIN — SODIUM CHLORIDE 1000 ML: 9 INJECTION, SOLUTION INTRAVENOUS at 11:45

## 2022-12-28 RX ADMIN — LEVETIRACETAM 1000 MG: 100 INJECTION, SOLUTION INTRAVENOUS at 12:05

## 2022-12-28 RX ADMIN — ACETAMINOPHEN 1000 MG: 500 TABLET ORAL at 11:11

## 2022-12-28 RX ADMIN — IOHEXOL 80 ML: 350 INJECTION, SOLUTION INTRAVENOUS at 12:46

## 2022-12-28 RX ADMIN — APIXABAN 5 MG: 5 TABLET, FILM COATED ORAL at 21:26

## 2022-12-28 RX ADMIN — METRONIDAZOLE 500 MG: 500 INJECTION, SOLUTION INTRAVENOUS at 12:35

## 2022-12-28 RX ADMIN — CEFTRIAXONE SODIUM 2000 MG: 10 INJECTION, POWDER, FOR SOLUTION INTRAVENOUS at 12:08

## 2022-12-28 RX ADMIN — ACETAMINOPHEN 650 MG: 325 TABLET ORAL at 16:38

## 2022-12-28 RX ADMIN — SODIUM CHLORIDE: 9 INJECTION, SOLUTION INTRAVENOUS at 16:31

## 2022-12-28 RX ADMIN — SODIUM CHLORIDE 1000 ML: 9 INJECTION, SOLUTION INTRAVENOUS at 11:14

## 2022-12-28 RX ADMIN — POTASSIUM CHLORIDE 40 MEQ: 1500 TABLET, EXTENDED RELEASE ORAL at 18:06

## 2022-12-28 RX ADMIN — SODIUM CHLORIDE, PRESERVATIVE FREE 2 ML: 5 INJECTION INTRAVENOUS at 11:14

## 2022-12-28 RX ADMIN — LEVETIRACETAM 1000 MG: 500 TABLET, FILM COATED ORAL at 21:26

## 2022-12-28 RX ADMIN — ATORVASTATIN CALCIUM 20 MG: 20 TABLET, FILM COATED ORAL at 21:26

## 2022-12-28 SDOH — SOCIAL STABILITY: SOCIAL NETWORK: SUPPORT SYSTEMS: SPOUSE/SIGNIFICANT OTHER;CHILDREN

## 2022-12-28 SDOH — ECONOMIC STABILITY: HOUSING INSECURITY: WHAT IS YOUR LIVING SITUATION TODAY?: SPOUSE;CHILDREN

## 2022-12-28 SDOH — ECONOMIC STABILITY: FOOD INSECURITY: HOW OFTEN IN THE PAST 12 MONTHS WERE YOU WORRIED OR STRESSED ABOUT HAVING ENOUGH MONEY TO BUY NUTRITIOUS MEALS?: NEVER

## 2022-12-28 SDOH — ECONOMIC STABILITY: TRANSPORTATION INSECURITY
IN THE PAST 12 MONTHS, HAS LACK OF TRANSPORTATION KEPT YOU FROM MEETINGS, WORK, OR FROM GETTING THINGS NEEDED FOR DAILY LIVING?: NO

## 2022-12-28 SDOH — HEALTH STABILITY: PHYSICAL HEALTH: DO YOU HAVE DIFFICULTY DRESSING OR BATHING?: NO

## 2022-12-28 SDOH — HEALTH STABILITY: GENERAL
BECAUSE OF A PHYSICAL, MENTAL, OR EMOTIONAL CONDITION, DO YOU HAVE SERIOUS DIFFICULTY CONCENTRATING, REMEMBERING OR MAKING DECISIONS?: NO

## 2022-12-28 SDOH — ECONOMIC STABILITY: GENERAL

## 2022-12-28 SDOH — HEALTH STABILITY: GENERAL: BECAUSE OF A PHYSICAL, MENTAL, OR EMOTIONAL CONDITION, DO YOU HAVE DIFFICULTY DOING ERRANDS ALONE?: NO

## 2022-12-28 SDOH — ECONOMIC STABILITY: HOUSING INSECURITY: ARE YOU WORRIED ABOUT LOSING YOUR HOUSING?: NO

## 2022-12-28 SDOH — HEALTH STABILITY: PHYSICAL HEALTH: DO YOU HAVE SERIOUS DIFFICULTY WALKING OR CLIMBING STAIRS?: NO

## 2022-12-28 SDOH — ECONOMIC STABILITY: TRANSPORTATION INSECURITY
IN THE PAST 12 MONTHS, HAS THE LACK OF TRANSPORTATION KEPT YOU FROM MEDICAL APPOINTMENTS OR FROM GETTING MEDICATIONS?: NO

## 2022-12-28 SDOH — SOCIAL STABILITY: SOCIAL NETWORK
HOW OFTEN DO YOU SEE OR TALK TO PEOPLE THAT YOU CARE ABOUT AND FEEL CLOSE TO? (FOR EXAMPLE: TALKING TO FRIENDS ON THE PHONE, VISITING FRIENDS OR FAMILY, GOING TO CHURCH OR CLUB MEETINGS): 1 OR 2 TIMES A WEEK

## 2022-12-28 ASSESSMENT — PAIN SCALES - GENERAL
PAINLEVEL_OUTOF10: 0

## 2022-12-28 ASSESSMENT — LIFESTYLE VARIABLES
AUDIT-C TOTAL SCORE: 2
HOW OFTEN DO YOU HAVE 6 OR MORE DRINKS ON ONE OCCASION: NEVER
HOW MANY STANDARD DRINKS CONTAINING ALCOHOL DO YOU HAVE ON A TYPICAL DAY: 0,1 OR 2
HOW OFTEN DO YOU HAVE A DRINK CONTAINING ALCOHOL: 2 TO 4 TIMES PER MONTH
ALCOHOL_USE_STATUS: NO OR LOW RISK WITH VALIDATED TOOL

## 2022-12-28 ASSESSMENT — ENCOUNTER SYMPTOMS
ABDOMINAL PAIN: 1
DIZZINESS: 0
WHEEZING: 0
COLOR CHANGE: 0
NUMBNESS: 0
WOUND: 0
CHILLS: 0
VOMITING: 1
POLYDIPSIA: 0
ACTIVITY CHANGE: 0
SORE THROAT: 0
EYE PAIN: 0
FACIAL SWELLING: 0
CONFUSION: 0
NAUSEA: 1
FEVER: 1
HEADACHES: 0
COUGH: 0
EYE REDNESS: 0
SHORTNESS OF BREATH: 0
LIGHT-HEADEDNESS: 1
BRUISES/BLEEDS EASILY: 0
DIARRHEA: 0
BACK PAIN: 0
EYE DISCHARGE: 0
POLYPHAGIA: 0

## 2022-12-28 ASSESSMENT — PATIENT HEALTH QUESTIONNAIRE - PHQ9
2. FEELING DOWN, DEPRESSED OR HOPELESS: NOT AT ALL
CLINICAL INTERPRETATION OF PHQ2 SCORE: NO FURTHER SCREENING NEEDED
SUM OF ALL RESPONSES TO PHQ9 QUESTIONS 1 AND 2: 0
SUM OF ALL RESPONSES TO PHQ9 QUESTIONS 1 AND 2: 0
1. LITTLE INTEREST OR PLEASURE IN DOING THINGS: NOT AT ALL
IS PATIENT ABLE TO COMPLETE PHQ2 OR PHQ9: YES

## 2022-12-28 ASSESSMENT — COLUMBIA-SUICIDE SEVERITY RATING SCALE - C-SSRS
1. WITHIN THE PAST MONTH, HAVE YOU WISHED YOU WERE DEAD OR WISHED YOU COULD GO TO SLEEP AND NOT WAKE UP?: NO
IS THE PATIENT ABLE TO COMPLETE C-SSRS: YES
6. HAVE YOU EVER DONE ANYTHING, STARTED TO DO ANYTHING, OR PREPARED TO DO ANYTHING TO END YOUR LIFE?: NO
2. HAVE YOU ACTUALLY HAD ANY THOUGHTS OF KILLING YOURSELF?: NO

## 2022-12-28 ASSESSMENT — MOVEMENT AND STRENGTH ASSESSMENTS
FACE_JAW: FACE SYMMETRICAL
HEAD_NECK: FULL RANGE OF MOTION
ALL_EXTREMITIES: EQUAL STRENGTH/TONE/MOVEMENT

## 2022-12-28 ASSESSMENT — ACTIVITIES OF DAILY LIVING (ADL)
ADL_SHORT_OF_BREATH: NO
RECENT_DECLINE_ADL: NO
ADL_BEFORE_ADMISSION: INDEPENDENT
ADL_SCORE: 12

## 2022-12-28 ASSESSMENT — ABNORMAL INVOLUNTARY MOVEMENTS - GENERAL NEURO
AIM_FACE_JAW: TREMOR
AIM_HEAD_NECK: TREMOR

## 2022-12-29 LAB
A BAUMANNII DNA BLD POS QL NAA+NON-PROBE: NOT DETECTED
ANION GAP SERPL CALC-SCNC: 13 MMOL/L (ref 7–19)
B FRAGILIS DNA BLD POS QL NAA+NON-PROBE: NOT DETECTED
BASOPHILS # BLD: 0 K/MCL (ref 0–0.3)
BASOPHILS NFR BLD: 0 %
BLACTX-M ISLT/SPM QL: NOT DETECTED
BLAIMP ISLT/SPM QL: NOT DETECTED
BLAKPC ANORECTLISLT QL NAA+PROBE: NOT DETECTED
BLANDM ANORECTLISLT QL NAA+PROBE: NOT DETECTED
BLAOXA-48 ISLT/SPM QL: NOT DETECTED
BLAVIM ISLT/SPM QL: NOT DETECTED
BUN SERPL-MCNC: 22 MG/DL (ref 6–20)
BUN/CREAT SERPL: 31 (ref 7–25)
C ALBICANS DNA BLD POS QL NAA+NON-PROBE: NOT DETECTED
C AURIS DNA BLD POS QL NAA+NON-PROBE: NOT DETECTED
C GATTII+NEOFOR DNA BLD POS QL NAA+N-PRB: NOT DETECTED
C GLABRATA DNA BLD POS QL NAA+NON-PROBE: NOT DETECTED
C KRUSEI DNA BLD POS QL NAA+NON-PROBE: NOT DETECTED
C PARAP DNA BLD POS QL NAA+NON-PROBE: NOT DETECTED
C TROPICLS DNA BLD POS QL NAA+NON-PROBE: NOT DETECTED
CALCIUM SERPL-MCNC: 7.7 MG/DL (ref 8.4–10.2)
CHLORIDE SERPL-SCNC: 102 MMOL/L (ref 97–110)
CO2 SERPL-SCNC: 24 MMOL/L (ref 21–32)
COLISTIN RES MCR-1 ISLT/SPM QL: NOT DETECTED
CREAT SERPL-MCNC: 0.71 MG/DL (ref 0.51–0.95)
DEPRECATED RDW RBC: 45.8 FL (ref 39–50)
E CLOAC COMP DNA BLD POS NAA+NON-PROBE: NOT DETECTED
E COLI DNA BLD POS QL NAA+NON-PROBE: DETECTED
E FAECALIS DNA BLD POS QL NAA+NON-PROBE: NOT DETECTED
E FAECIUM DNA BLD POS QL NAA+NON-PROBE: NOT DETECTED
EOSINOPHIL # BLD: 0 K/MCL (ref 0–0.5)
EOSINOPHIL NFR BLD: 0 %
ERYTHROCYTE [DISTWIDTH] IN BLOOD: 12.7 % (ref 11–15)
FASTING DURATION TIME PATIENT: ABNORMAL H
GFR SERPLBLD BASED ON 1.73 SQ M-ARVRAT: >90 ML/MIN
GLUCOSE SERPL-MCNC: 95 MG/DL (ref 70–99)
GP B STREP DNA CSF QL NAA+NON-PROBE: NOT DETECTED
HAEM INFLU DNA BLD POS QL NAA+NON-PROBE: NOT DETECTED
HCT VFR BLD CALC: 35.6 % (ref 36–46.5)
HGB BLD-MCNC: 12 G/DL (ref 12–15.5)
IMM GRANULOCYTES # BLD AUTO: 0.1 K/MCL (ref 0–0.2)
IMM GRANULOCYTES # BLD: 1 %
K OXYTOCA DNA BLD POS QL NAA+NON-PROBE: NOT DETECTED
KLEBSIELLA SP DNA BLD POS QL NAA+NON-PRB: NOT DETECTED
KLEBSIELLA SP DNA BLD POS QL NAA+NON-PRB: NOT DETECTED
L MONOCYTOG DNA BLD POS QL NAA+NON-PROBE: NOT DETECTED
LYMPHOCYTES # BLD: 0.4 K/MCL (ref 1–4)
LYMPHOCYTES NFR BLD: 5 %
MCH RBC QN AUTO: 32.9 PG (ref 26–34)
MCHC RBC AUTO-ENTMCNC: 33.7 G/DL (ref 32–36.5)
MCV RBC AUTO: 97.5 FL (ref 78–100)
MONOCYTES # BLD: 0.7 K/MCL (ref 0.3–0.9)
MONOCYTES NFR BLD: 8 %
N MEN DNA BLD POS QL NAA+NON-PROBE: NOT DETECTED
NEUTROPHILS # BLD: 7.7 K/MCL (ref 1.8–7.7)
NEUTROPHILS NFR BLD: 86 %
NRBC BLD MANUAL-RTO: 0 /100 WBC
P AERUGINOSA DNA BLD POS NAA+NON-PROBE: NOT DETECTED
PLATELET # BLD AUTO: 107 K/MCL (ref 140–450)
POTASSIUM SERPL-SCNC: 3.6 MMOL/L (ref 3.4–5.1)
PROTEUS SP DNA BLD POS QL NAA+NON-PROBE: NOT DETECTED
RAINBOW EXTRA TUBES HOLD SPECIMEN: NORMAL
RBC # BLD: 3.65 MIL/MCL (ref 4–5.2)
S LUGDUNENSIS DNA BLD POS QL NAA+NON-PRB: NOT DETECTED
S MALTOPHILIA DNA BLD POS QL NAA+NON-PRB: NOT DETECTED
S MARCESCENS DNA BLD POS NAA+NON-PROBE: NOT DETECTED
S PNEUM DNA BLD POS QL NAA+NON-PROBE: NOT DETECTED
S PYO DNA BLD POS QL NAA+NON-PROBE: NOT DETECTED
SALMONELLA DNA BLD POS QL NAA+NON-PROBE: NOT DETECTED
SODIUM SERPL-SCNC: 135 MMOL/L (ref 135–145)
STAPHYLOCOCCUS AUREUS: NOT DETECTED
STAPHYLOCOCCUS SPECIES: NOT DETECTED
STREPTOCOCCUS DNA BLD POS NAA+NON-PROBE: NOT DETECTED
WBC # BLD: 9 K/MCL (ref 4.2–11)

## 2022-12-29 PROCEDURE — 10002807 HB RX 258: Performed by: FAMILY MEDICINE

## 2022-12-29 PROCEDURE — 10002801 HB RX 250 W/O HCPCS: Performed by: EMERGENCY MEDICINE

## 2022-12-29 PROCEDURE — 85025 COMPLETE CBC W/AUTO DIFF WBC: CPT | Performed by: FAMILY MEDICINE

## 2022-12-29 PROCEDURE — 36415 COLL VENOUS BLD VENIPUNCTURE: CPT | Performed by: FAMILY MEDICINE

## 2022-12-29 PROCEDURE — 10006031 HB ROOM CHARGE TELEMETRY

## 2022-12-29 PROCEDURE — 10002803 HB RX 637: Performed by: FAMILY MEDICINE

## 2022-12-29 PROCEDURE — 10002800 HB RX 250 W HCPCS: Performed by: FAMILY MEDICINE

## 2022-12-29 PROCEDURE — 80048 BASIC METABOLIC PNL TOTAL CA: CPT | Performed by: FAMILY MEDICINE

## 2022-12-29 PROCEDURE — 10004651 HB RX, NO CHARGE ITEM: Performed by: EMERGENCY MEDICINE

## 2022-12-29 PROCEDURE — 99233 SBSQ HOSP IP/OBS HIGH 50: CPT | Performed by: FAMILY MEDICINE

## 2022-12-29 RX ADMIN — ATORVASTATIN CALCIUM 20 MG: 20 TABLET, FILM COATED ORAL at 22:00

## 2022-12-29 RX ADMIN — SODIUM CHLORIDE, PRESERVATIVE FREE 2 ML: 5 INJECTION INTRAVENOUS at 09:01

## 2022-12-29 RX ADMIN — CEFTRIAXONE SODIUM 1000 MG: 10 INJECTION, POWDER, FOR SOLUTION INTRAVENOUS at 08:56

## 2022-12-29 RX ADMIN — METRONIDAZOLE 500 MG: 500 INJECTION, SOLUTION INTRAVENOUS at 13:43

## 2022-12-29 RX ADMIN — METOPROLOL TARTRATE 25 MG: 25 TABLET, FILM COATED ORAL at 08:56

## 2022-12-29 RX ADMIN — LEVETIRACETAM 1000 MG: 500 TABLET, FILM COATED ORAL at 22:00

## 2022-12-29 RX ADMIN — METRONIDAZOLE 500 MG: 500 INJECTION, SOLUTION INTRAVENOUS at 05:19

## 2022-12-29 RX ADMIN — METRONIDAZOLE 500 MG: 500 INJECTION, SOLUTION INTRAVENOUS at 22:07

## 2022-12-29 RX ADMIN — SODIUM CHLORIDE: 9 INJECTION, SOLUTION INTRAVENOUS at 04:00

## 2022-12-29 RX ADMIN — APIXABAN 5 MG: 5 TABLET, FILM COATED ORAL at 08:56

## 2022-12-29 RX ADMIN — LEVETIRACETAM 1000 MG: 500 TABLET, FILM COATED ORAL at 08:55

## 2022-12-29 RX ADMIN — METOPROLOL TARTRATE 25 MG: 25 TABLET, FILM COATED ORAL at 22:00

## 2022-12-29 RX ADMIN — APIXABAN 5 MG: 5 TABLET, FILM COATED ORAL at 22:00

## 2022-12-29 ASSESSMENT — PAIN SCALES - GENERAL
PAINLEVEL_OUTOF10: 0
PAINLEVEL_OUTOF10: 0

## 2022-12-30 VITALS
HEIGHT: 65 IN | RESPIRATION RATE: 16 BRPM | OXYGEN SATURATION: 95 % | DIASTOLIC BLOOD PRESSURE: 76 MMHG | BODY MASS INDEX: 23.4 KG/M2 | SYSTOLIC BLOOD PRESSURE: 118 MMHG | WEIGHT: 140.43 LBS | HEART RATE: 85 BPM | TEMPERATURE: 98.8 F

## 2022-12-30 LAB
ANION GAP SERPL CALC-SCNC: 13 MMOL/L (ref 7–19)
BACTERIA BLD CULT: ABNORMAL
BACTERIA UR CULT: ABNORMAL
BUN SERPL-MCNC: 19 MG/DL (ref 6–20)
BUN/CREAT SERPL: 27 (ref 7–25)
CALCIUM SERPL-MCNC: 8.5 MG/DL (ref 8.4–10.2)
CHLORIDE SERPL-SCNC: 102 MMOL/L (ref 97–110)
CO2 SERPL-SCNC: 23 MMOL/L (ref 21–32)
CREAT SERPL-MCNC: 0.7 MG/DL (ref 0.51–0.95)
DEPRECATED RDW RBC: 46.7 FL (ref 39–50)
ERYTHROCYTE [DISTWIDTH] IN BLOOD: 12.9 % (ref 11–15)
FASTING DURATION TIME PATIENT: ABNORMAL H
GFR SERPLBLD BASED ON 1.73 SQ M-ARVRAT: >90 ML/MIN
GLUCOSE SERPL-MCNC: 98 MG/DL (ref 70–99)
GRAM STN SPEC: ABNORMAL
HCT VFR BLD CALC: 36.2 % (ref 36–46.5)
HGB BLD-MCNC: 11.9 G/DL (ref 12–15.5)
MCH RBC QN AUTO: 32.7 PG (ref 26–34)
MCHC RBC AUTO-ENTMCNC: 32.9 G/DL (ref 32–36.5)
MCV RBC AUTO: 99.5 FL (ref 78–100)
NRBC BLD MANUAL-RTO: 0 /100 WBC
PLATELET # BLD AUTO: 121 K/MCL (ref 140–450)
POTASSIUM SERPL-SCNC: 3.3 MMOL/L (ref 3.4–5.1)
RBC # BLD: 3.64 MIL/MCL (ref 4–5.2)
SODIUM SERPL-SCNC: 135 MMOL/L (ref 135–145)
WBC # BLD: 9 K/MCL (ref 4.2–11)

## 2022-12-30 PROCEDURE — 80048 BASIC METABOLIC PNL TOTAL CA: CPT | Performed by: FAMILY MEDICINE

## 2022-12-30 PROCEDURE — 10002803 HB RX 637: Performed by: FAMILY MEDICINE

## 2022-12-30 PROCEDURE — 10002800 HB RX 250 W HCPCS: Performed by: FAMILY MEDICINE

## 2022-12-30 PROCEDURE — 10004651 HB RX, NO CHARGE ITEM: Performed by: EMERGENCY MEDICINE

## 2022-12-30 PROCEDURE — 99239 HOSP IP/OBS DSCHRG MGMT >30: CPT | Performed by: FAMILY MEDICINE

## 2022-12-30 PROCEDURE — 10002801 HB RX 250 W/O HCPCS: Performed by: EMERGENCY MEDICINE

## 2022-12-30 PROCEDURE — 36415 COLL VENOUS BLD VENIPUNCTURE: CPT | Performed by: FAMILY MEDICINE

## 2022-12-30 PROCEDURE — 85027 COMPLETE CBC AUTOMATED: CPT | Performed by: FAMILY MEDICINE

## 2022-12-30 RX ORDER — CEPHALEXIN 500 MG/1
500 CAPSULE ORAL 2 TIMES DAILY
Qty: 14 CAPSULE | Refills: 0 | Status: SHIPPED | OUTPATIENT
Start: 2022-12-30 | End: 2022-12-30 | Stop reason: SDUPTHER

## 2022-12-30 RX ORDER — CEPHALEXIN 500 MG/1
500 CAPSULE ORAL 4 TIMES DAILY
Qty: 28 CAPSULE | Refills: 0 | Status: SHIPPED | OUTPATIENT
Start: 2022-12-30 | End: 2023-01-06

## 2022-12-30 RX ADMIN — METOPROLOL TARTRATE 25 MG: 25 TABLET, FILM COATED ORAL at 08:43

## 2022-12-30 RX ADMIN — LEVETIRACETAM 1000 MG: 500 TABLET, FILM COATED ORAL at 08:43

## 2022-12-30 RX ADMIN — APIXABAN 5 MG: 5 TABLET, FILM COATED ORAL at 08:43

## 2022-12-30 RX ADMIN — SODIUM CHLORIDE, PRESERVATIVE FREE 2 ML: 5 INJECTION INTRAVENOUS at 08:48

## 2022-12-30 RX ADMIN — CEFTRIAXONE SODIUM 1000 MG: 10 INJECTION, POWDER, FOR SOLUTION INTRAVENOUS at 08:43

## 2022-12-30 RX ADMIN — METRONIDAZOLE 500 MG: 500 INJECTION, SOLUTION INTRAVENOUS at 05:47

## 2022-12-30 ASSESSMENT — PAIN SCALES - GENERAL
PAINLEVEL_OUTOF10: 0
PAINLEVEL_OUTOF10: 0

## 2022-12-31 LAB
BACTERIA BLD CULT: ABNORMAL
GRAM STN SPEC: ABNORMAL

## 2023-01-06 ENCOUNTER — HOSPITAL ENCOUNTER (EMERGENCY)
Age: 58
Discharge: HOME | End: 2023-01-06
Attending: EMERGENCY MEDICINE | Admitting: INTERNAL MEDICINE

## 2023-01-06 ENCOUNTER — APPOINTMENT (OUTPATIENT)
Dept: GENERAL RADIOLOGY | Age: 58
End: 2023-01-06
Attending: EMERGENCY MEDICINE

## 2023-01-06 VITALS
SYSTOLIC BLOOD PRESSURE: 141 MMHG | OXYGEN SATURATION: 100 % | RESPIRATION RATE: 18 BRPM | HEART RATE: 79 BPM | DIASTOLIC BLOOD PRESSURE: 78 MMHG | TEMPERATURE: 98.1 F

## 2023-01-06 DIAGNOSIS — E86.0 DEHYDRATION: Primary | ICD-10-CM

## 2023-01-06 LAB
ALBUMIN SERPL-MCNC: 2.7 G/DL (ref 3.6–5.1)
ALBUMIN/GLOB SERPL: 0.6 {RATIO} (ref 1–2.4)
ALP SERPL-CCNC: 103 UNITS/L (ref 45–117)
ALT SERPL-CCNC: 21 UNITS/L
ANION GAP SERPL CALC-SCNC: 10 MMOL/L (ref 7–19)
APPEARANCE UR: CLEAR
AST SERPL-CCNC: 31 UNITS/L
BASOPHILS # BLD: 0.1 K/MCL (ref 0–0.3)
BASOPHILS NFR BLD: 1 %
BILIRUB SERPL-MCNC: 0.3 MG/DL (ref 0.2–1)
BILIRUB UR QL STRIP: NEGATIVE
BUN SERPL-MCNC: 4 MG/DL (ref 6–20)
BUN/CREAT SERPL: 8 (ref 7–25)
CALCIUM SERPL-MCNC: 9.5 MG/DL (ref 8.4–10.2)
CHLORIDE SERPL-SCNC: 106 MMOL/L (ref 97–110)
CO2 SERPL-SCNC: 30 MMOL/L (ref 21–32)
COLOR UR: NORMAL
CREAT SERPL-MCNC: 0.53 MG/DL (ref 0.51–0.95)
DEPRECATED RDW RBC: 47 FL (ref 39–50)
EOSINOPHIL # BLD: 0.2 K/MCL (ref 0–0.5)
EOSINOPHIL NFR BLD: 2 %
ERYTHROCYTE [DISTWIDTH] IN BLOOD: 13.2 % (ref 11–15)
FASTING DURATION TIME PATIENT: ABNORMAL H
FLUAV RNA RESP QL NAA+PROBE: NOT DETECTED
FLUBV RNA RESP QL NAA+PROBE: NOT DETECTED
GFR SERPLBLD BASED ON 1.73 SQ M-ARVRAT: >90 ML/MIN
GLOBULIN SER-MCNC: 4.7 G/DL (ref 2–4)
GLUCOSE SERPL-MCNC: 120 MG/DL (ref 70–99)
GLUCOSE UR STRIP-MCNC: NEGATIVE MG/DL
HCT VFR BLD CALC: 37.4 % (ref 36–46.5)
HGB BLD-MCNC: 12.7 G/DL (ref 12–15.5)
HGB UR QL STRIP: NEGATIVE
IMM GRANULOCYTES # BLD AUTO: 0.2 K/MCL (ref 0–0.2)
IMM GRANULOCYTES # BLD: 2 %
KETONES UR STRIP-MCNC: NEGATIVE MG/DL
LACTATE BLDV-SCNC: 2.1 MMOL/L (ref 0–2)
LACTATE BLDV-SCNC: 2.3 MMOL/L (ref 0–2)
LEUKOCYTE ESTERASE UR QL STRIP: NEGATIVE
LIPASE SERPL-CCNC: 276 UNITS/L (ref 73–393)
LYMPHOCYTES # BLD: 1.2 K/MCL (ref 1–4)
LYMPHOCYTES NFR BLD: 14 %
MCH RBC QN AUTO: 33 PG (ref 26–34)
MCHC RBC AUTO-ENTMCNC: 34 G/DL (ref 32–36.5)
MCV RBC AUTO: 97.1 FL (ref 78–100)
MONOCYTES # BLD: 0.6 K/MCL (ref 0.3–0.9)
MONOCYTES NFR BLD: 6 %
NEUTROPHILS # BLD: 6.8 K/MCL (ref 1.8–7.7)
NEUTROPHILS NFR BLD: 75 %
NITRITE UR QL STRIP: NEGATIVE
NRBC BLD MANUAL-RTO: 0 /100 WBC
PH UR STRIP: 7 [PH] (ref 5–7)
PLATELET # BLD AUTO: 539 K/MCL (ref 140–450)
POTASSIUM SERPL-SCNC: 3.6 MMOL/L (ref 3.4–5.1)
PROT SERPL-MCNC: 7.4 G/DL (ref 6.4–8.2)
PROT UR STRIP-MCNC: NEGATIVE MG/DL
RAINBOW EXTRA TUBES HOLD SPECIMEN: NORMAL
RAINBOW EXTRA TUBES HOLD SPECIMEN: NORMAL
RBC # BLD: 3.85 MIL/MCL (ref 4–5.2)
RSV AG NPH QL IA.RAPID: NOT DETECTED
SARS-COV-2 RNA RESP QL NAA+PROBE: NOT DETECTED
SERVICE CMNT-IMP: NORMAL
SERVICE CMNT-IMP: NORMAL
SODIUM SERPL-SCNC: 142 MMOL/L (ref 135–145)
SP GR UR STRIP: 1.01 (ref 1–1.03)
UROBILINOGEN UR STRIP-MCNC: 0.2 MG/DL
WBC # BLD: 9 K/MCL (ref 4.2–11)

## 2023-01-06 PROCEDURE — 81003 URINALYSIS AUTO W/O SCOPE: CPT | Performed by: EMERGENCY MEDICINE

## 2023-01-06 PROCEDURE — C9803 HOPD COVID-19 SPEC COLLECT: HCPCS

## 2023-01-06 PROCEDURE — 83605 ASSAY OF LACTIC ACID: CPT | Performed by: EMERGENCY MEDICINE

## 2023-01-06 PROCEDURE — 10002800 HB RX 250 W HCPCS

## 2023-01-06 PROCEDURE — 80053 COMPREHEN METABOLIC PANEL: CPT | Performed by: EMERGENCY MEDICINE

## 2023-01-06 PROCEDURE — 87040 BLOOD CULTURE FOR BACTERIA: CPT | Performed by: EMERGENCY MEDICINE

## 2023-01-06 PROCEDURE — 10002800 HB RX 250 W HCPCS: Performed by: EMERGENCY MEDICINE

## 2023-01-06 PROCEDURE — 83690 ASSAY OF LIPASE: CPT | Performed by: EMERGENCY MEDICINE

## 2023-01-06 PROCEDURE — 36415 COLL VENOUS BLD VENIPUNCTURE: CPT

## 2023-01-06 PROCEDURE — 85025 COMPLETE CBC W/AUTO DIFF WBC: CPT | Performed by: EMERGENCY MEDICINE

## 2023-01-06 PROCEDURE — 0241U COVID/FLU/RSV PANEL: CPT | Performed by: EMERGENCY MEDICINE

## 2023-01-06 PROCEDURE — 96375 TX/PRO/DX INJ NEW DRUG ADDON: CPT

## 2023-01-06 PROCEDURE — 99283 EMERGENCY DEPT VISIT LOW MDM: CPT

## 2023-01-06 PROCEDURE — 96374 THER/PROPH/DIAG INJ IV PUSH: CPT

## 2023-01-06 PROCEDURE — 10002807 HB RX 258: Performed by: EMERGENCY MEDICINE

## 2023-01-06 PROCEDURE — 71045 X-RAY EXAM CHEST 1 VIEW: CPT

## 2023-01-06 PROCEDURE — 96361 HYDRATE IV INFUSION ADD-ON: CPT

## 2023-01-06 RX ORDER — ONDANSETRON 2 MG/ML
INJECTION INTRAMUSCULAR; INTRAVENOUS
Status: COMPLETED
Start: 2023-01-06 | End: 2023-01-06

## 2023-01-06 RX ORDER — CEFAZOLIN SODIUM/WATER 1 G/10 ML
1000 SYRINGE (ML) INTRAVENOUS ONCE
Status: COMPLETED | OUTPATIENT
Start: 2023-01-06 | End: 2023-01-06

## 2023-01-06 RX ORDER — ONDANSETRON 2 MG/ML
4 INJECTION INTRAMUSCULAR; INTRAVENOUS ONCE
Status: COMPLETED | OUTPATIENT
Start: 2023-01-06 | End: 2023-01-06

## 2023-01-06 RX ADMIN — CEFTRIAXONE SODIUM 1000 MG: 10 INJECTION, POWDER, FOR SOLUTION INTRAVENOUS at 10:12

## 2023-01-06 RX ADMIN — SODIUM CHLORIDE 1000 ML: 0.9 INJECTION, SOLUTION INTRAVENOUS at 10:12

## 2023-01-06 RX ADMIN — ONDANSETRON 4 MG: 2 INJECTION INTRAMUSCULAR; INTRAVENOUS at 07:29

## 2023-01-06 RX ADMIN — SODIUM CHLORIDE 1000 ML: 0.9 INJECTION, SOLUTION INTRAVENOUS at 08:31

## 2023-01-06 ASSESSMENT — COGNITIVE AND FUNCTIONAL STATUS - GENERAL
DO YOU HAVE DIFFICULTY DRESSING OR BATHING: NO
BECAUSE OF A PHYSICAL, MENTAL, OR EMOTIONAL CONDITION, DO YOU HAVE DIFFICULTY DOING ERRANDS ALONE: NO
BECAUSE OF A PHYSICAL, MENTAL, OR EMOTIONAL CONDITION, DO YOU HAVE SERIOUS DIFFICULTY CONCENTRATING, REMEMBERING OR MAKING DECISIONS: NO
DO YOU HAVE SERIOUS DIFFICULTY WALKING OR CLIMBING STAIRS: NO

## 2023-01-11 LAB
BACTERIA BLD CULT: NORMAL
BACTERIA BLD CULT: NORMAL

## 2023-07-23 ENCOUNTER — HOSPITAL ENCOUNTER (EMERGENCY)
Age: 58
Discharge: HOME | End: 2023-07-23
Attending: EMERGENCY MEDICINE

## 2023-07-23 VITALS
SYSTOLIC BLOOD PRESSURE: 127 MMHG | BODY MASS INDEX: 22.04 KG/M2 | WEIGHT: 132.28 LBS | DIASTOLIC BLOOD PRESSURE: 79 MMHG | OXYGEN SATURATION: 97 % | TEMPERATURE: 98.2 F | HEIGHT: 65 IN | HEART RATE: 88 BPM | RESPIRATION RATE: 18 BRPM

## 2023-07-23 DIAGNOSIS — N30.00 ACUTE CYSTITIS WITHOUT HEMATURIA: Primary | ICD-10-CM

## 2023-07-23 LAB
APPEARANCE UR: ABNORMAL
BACTERIA #/AREA URNS HPF: ABNORMAL /HPF
BILIRUB UR QL STRIP: NEGATIVE
CLUE CELLS VAG QL WET PREP: NORMAL
COLOR UR: YELLOW
GLUCOSE UR STRIP-MCNC: NEGATIVE MG/DL
HGB UR QL STRIP: ABNORMAL
HYALINE CASTS #/AREA URNS LPF: ABNORMAL /LPF
KETONES UR STRIP-MCNC: ABNORMAL MG/DL
LEUKOCYTE ESTERASE UR QL STRIP: ABNORMAL
MUCOUS THREADS URNS QL MICRO: PRESENT
NITRITE UR QL STRIP: NEGATIVE
PH UR STRIP: 6 [PH] (ref 5–7)
PROT UR STRIP-MCNC: 30 MG/DL
RBC #/AREA URNS HPF: ABNORMAL /HPF
SP GR UR STRIP: 1.02 (ref 1–1.03)
SQUAMOUS #/AREA URNS HPF: ABNORMAL /HPF
T VAGINALIS VAG QL WET PREP: NORMAL
UROBILINOGEN UR STRIP-MCNC: 0.2 MG/DL
WBC #/AREA URNS HPF: >100 /HPF
YEAST VAG QL WET PREP: NORMAL

## 2023-07-23 PROCEDURE — 81001 URINALYSIS AUTO W/SCOPE: CPT | Performed by: EMERGENCY MEDICINE

## 2023-07-23 PROCEDURE — 87210 SMEAR WET MOUNT SALINE/INK: CPT | Performed by: EMERGENCY MEDICINE

## 2023-07-23 PROCEDURE — 87186 SC STD MICRODIL/AGAR DIL: CPT | Performed by: EMERGENCY MEDICINE

## 2023-07-23 PROCEDURE — 87491 CHLMYD TRACH DNA AMP PROBE: CPT | Performed by: EMERGENCY MEDICINE

## 2023-07-23 PROCEDURE — 99283 EMERGENCY DEPT VISIT LOW MDM: CPT

## 2023-07-23 RX ORDER — CEFAZOLIN SODIUM/WATER 1 G/10 ML
1000 SYRINGE (ML) INTRAVENOUS ONCE
Status: DISCONTINUED | OUTPATIENT
Start: 2023-07-23 | End: 2023-07-23

## 2023-07-23 RX ORDER — CEFDINIR 300 MG/1
300 CAPSULE ORAL 2 TIMES DAILY
Qty: 14 CAPSULE | Refills: 0 | Status: SHIPPED | OUTPATIENT
Start: 2023-07-23 | End: 2023-07-30

## 2023-07-23 ASSESSMENT — PAIN SCALES - GENERAL: PAINLEVEL_OUTOF10: 6

## 2023-07-24 LAB
C TRACH RRNA CVX QL NAA+PROBE: NEGATIVE
Lab: NORMAL
N GONORRHOEA RRNA CVX QL NAA+PROBE: NEGATIVE

## 2023-07-25 LAB — BACTERIA UR CULT: ABNORMAL

## 2023-12-05 ENCOUNTER — APPOINTMENT (OUTPATIENT)
Dept: GENERAL RADIOLOGY | Age: 58
End: 2023-12-05
Attending: EMERGENCY MEDICINE

## 2023-12-05 ENCOUNTER — APPOINTMENT (OUTPATIENT)
Dept: ULTRASOUND IMAGING | Age: 58
End: 2023-12-05
Attending: EMERGENCY MEDICINE

## 2023-12-05 ENCOUNTER — HOSPITAL ENCOUNTER (OUTPATIENT)
Age: 58
Setting detail: OBSERVATION
Discharge: HOME | End: 2023-12-07
Attending: EMERGENCY MEDICINE | Admitting: INTERNAL MEDICINE

## 2023-12-05 DIAGNOSIS — G89.29 CHRONIC PAIN OF LEFT KNEE: ICD-10-CM

## 2023-12-05 DIAGNOSIS — Z79.01 CURRENT USE OF LONG TERM ANTICOAGULATION: ICD-10-CM

## 2023-12-05 DIAGNOSIS — E83.42 HYPOMAGNESEMIA: ICD-10-CM

## 2023-12-05 DIAGNOSIS — R25.2 LEG CRAMPING: Primary | ICD-10-CM

## 2023-12-05 DIAGNOSIS — Z86.79 HISTORY OF ATRIAL FIBRILLATION: ICD-10-CM

## 2023-12-05 DIAGNOSIS — M17.12 PRIMARY OSTEOARTHRITIS OF LEFT KNEE: ICD-10-CM

## 2023-12-05 DIAGNOSIS — R11.2 NAUSEA AND VOMITING IN ADULT: ICD-10-CM

## 2023-12-05 DIAGNOSIS — F10.930 ALCOHOL WITHDRAWAL SYNDROME WITHOUT COMPLICATION (CMD): ICD-10-CM

## 2023-12-05 DIAGNOSIS — K29.20 CHRONIC ALCOHOLIC GASTRITIS WITHOUT HEMORRHAGE: ICD-10-CM

## 2023-12-05 DIAGNOSIS — M25.562 CHRONIC PAIN OF LEFT KNEE: ICD-10-CM

## 2023-12-05 PROBLEM — E87.6 HYPOKALEMIA: Status: ACTIVE | Noted: 2022-05-05

## 2023-12-05 PROBLEM — E78.5 DYSLIPIDEMIA: Status: ACTIVE | Noted: 2022-03-29

## 2023-12-05 PROBLEM — I48.0 PAF (PAROXYSMAL ATRIAL FIBRILLATION) (CMD): Status: ACTIVE | Noted: 2022-05-23

## 2023-12-05 PROBLEM — F10.90 HEAVY ALCOHOL USE: Status: ACTIVE | Noted: 2022-02-08

## 2023-12-05 PROBLEM — I25.10 ATHEROSCLEROSIS OF NATIVE CORONARY ARTERY OF NATIVE HEART WITHOUT ANGINA PECTORIS: Status: ACTIVE | Noted: 2023-09-12

## 2023-12-05 PROBLEM — R06.83 SNORING: Status: ACTIVE | Noted: 2023-09-12

## 2023-12-05 PROBLEM — F10.230 ALCOHOL DEPENDENCE WITH UNCOMPLICATED WITHDRAWAL (CMD): Status: ACTIVE | Noted: 2023-12-05

## 2023-12-05 PROBLEM — R74.01 TRANSAMINITIS: Status: ACTIVE | Noted: 2023-12-05

## 2023-12-05 PROBLEM — K70.30 ALCOHOLIC CIRRHOSIS OF LIVER WITHOUT ASCITES (CMD): Status: ACTIVE | Noted: 2022-05-05

## 2023-12-05 PROBLEM — R56.9 SEIZURE (CMD): Status: ACTIVE | Noted: 2022-02-08

## 2023-12-05 PROBLEM — K76.89 LIVER CYST: Status: ACTIVE | Noted: 2022-03-30

## 2023-12-05 PROBLEM — I48.91 ATRIAL FIBRILLATION WITH RVR (CMD): Status: ACTIVE | Noted: 2022-05-05

## 2023-12-05 PROBLEM — N39.0 RECURRENT UTI: Status: ACTIVE | Noted: 2023-08-01

## 2023-12-05 LAB
ALBUMIN SERPL-MCNC: 3.9 G/DL (ref 3.6–5.1)
ALBUMIN/GLOB SERPL: 0.9 {RATIO} (ref 1–2.4)
ALP SERPL-CCNC: 108 UNITS/L (ref 45–117)
ALT SERPL-CCNC: 62 UNITS/L
ANION GAP SERPL CALC-SCNC: 14 MMOL/L (ref 7–19)
APTT PPP: 24 SEC (ref 22–32)
AST SERPL-CCNC: 95 UNITS/L
BASOPHILS # BLD: 0 K/MCL (ref 0–0.3)
BASOPHILS NFR BLD: 1 %
BILIRUB SERPL-MCNC: 0.8 MG/DL (ref 0.2–1)
BUN SERPL-MCNC: 17 MG/DL (ref 6–20)
BUN/CREAT SERPL: 33 (ref 7–25)
CALCIUM SERPL-MCNC: 9.9 MG/DL (ref 8.4–10.2)
CHLORIDE SERPL-SCNC: 102 MMOL/L (ref 97–110)
CO2 SERPL-SCNC: 28 MMOL/L (ref 21–32)
CREAT SERPL-MCNC: 0.52 MG/DL (ref 0.51–0.95)
DEPRECATED RDW RBC: 47.3 FL (ref 39–50)
EGFRCR SERPLBLD CKD-EPI 2021: >90 ML/MIN/{1.73_M2}
EOSINOPHIL # BLD: 0 K/MCL (ref 0–0.5)
EOSINOPHIL NFR BLD: 0 %
ERYTHROCYTE [DISTWIDTH] IN BLOOD: 13.2 % (ref 11–15)
ETHANOL SERPL-MCNC: NORMAL MG/DL
FASTING DURATION TIME PATIENT: ABNORMAL H
GLOBULIN SER-MCNC: 4.2 G/DL (ref 2–4)
GLUCOSE SERPL-MCNC: 109 MG/DL (ref 70–99)
HCT VFR BLD CALC: 36.6 % (ref 36–46.5)
HGB BLD-MCNC: 11.5 G/DL (ref 12–15.5)
IMM GRANULOCYTES # BLD AUTO: 0 K/MCL (ref 0–0.2)
IMM GRANULOCYTES # BLD: 0 %
INR PPP: 1.1
LYMPHOCYTES # BLD: 0.6 K/MCL (ref 1–4)
LYMPHOCYTES NFR BLD: 11 %
MAGNESIUM SERPL-MCNC: 1.1 MG/DL (ref 1.7–2.4)
MCH RBC QN AUTO: 30.4 PG (ref 26–34)
MCHC RBC AUTO-ENTMCNC: 31.4 G/DL (ref 32–36.5)
MCV RBC AUTO: 96.8 FL (ref 78–100)
MONOCYTES # BLD: 0.7 K/MCL (ref 0.3–0.9)
MONOCYTES NFR BLD: 12 %
NEUTROPHILS # BLD: 4.3 K/MCL (ref 1.8–7.7)
NEUTROPHILS NFR BLD: 76 %
NRBC BLD MANUAL-RTO: 0 /100 WBC
PHOSPHATE SERPL-MCNC: 3.7 MG/DL (ref 2.4–4.7)
PLATELET # BLD AUTO: 136 K/MCL (ref 140–450)
POTASSIUM SERPL-SCNC: 3.5 MMOL/L (ref 3.4–5.1)
PROT SERPL-MCNC: 8.1 G/DL (ref 6.4–8.2)
PROTHROMBIN TIME: 11.8 SEC (ref 9.7–11.8)
RBC # BLD: 3.78 MIL/MCL (ref 4–5.2)
SODIUM SERPL-SCNC: 140 MMOL/L (ref 135–145)
WBC # BLD: 5.7 K/MCL (ref 4.2–11)

## 2023-12-05 PROCEDURE — 73590 X-RAY EXAM OF LOWER LEG: CPT

## 2023-12-05 PROCEDURE — 10002803 HB RX 637: Performed by: EMERGENCY MEDICINE

## 2023-12-05 PROCEDURE — 96376 TX/PRO/DX INJ SAME DRUG ADON: CPT

## 2023-12-05 PROCEDURE — 85610 PROTHROMBIN TIME: CPT | Performed by: EMERGENCY MEDICINE

## 2023-12-05 PROCEDURE — G0378 HOSPITAL OBSERVATION PER HR: HCPCS

## 2023-12-05 PROCEDURE — 99223 1ST HOSP IP/OBS HIGH 75: CPT | Performed by: INTERNAL MEDICINE

## 2023-12-05 PROCEDURE — 93971 EXTREMITY STUDY: CPT

## 2023-12-05 PROCEDURE — 10002800 HB RX 250 W HCPCS: Performed by: EMERGENCY MEDICINE

## 2023-12-05 PROCEDURE — 80053 COMPREHEN METABOLIC PANEL: CPT | Performed by: EMERGENCY MEDICINE

## 2023-12-05 PROCEDURE — 96374 THER/PROPH/DIAG INJ IV PUSH: CPT

## 2023-12-05 PROCEDURE — 93005 ELECTROCARDIOGRAM TRACING: CPT | Performed by: EMERGENCY MEDICINE

## 2023-12-05 PROCEDURE — 82077 ASSAY SPEC XCP UR&BREATH IA: CPT | Performed by: EMERGENCY MEDICINE

## 2023-12-05 PROCEDURE — 10002807 HB RX 258: Performed by: EMERGENCY MEDICINE

## 2023-12-05 PROCEDURE — 83735 ASSAY OF MAGNESIUM: CPT | Performed by: EMERGENCY MEDICINE

## 2023-12-05 PROCEDURE — 73630 X-RAY EXAM OF FOOT: CPT

## 2023-12-05 PROCEDURE — 85730 THROMBOPLASTIN TIME PARTIAL: CPT | Performed by: EMERGENCY MEDICINE

## 2023-12-05 PROCEDURE — 10002803 HB RX 637: Performed by: INTERNAL MEDICINE

## 2023-12-05 PROCEDURE — 96375 TX/PRO/DX INJ NEW DRUG ADDON: CPT

## 2023-12-05 PROCEDURE — 36415 COLL VENOUS BLD VENIPUNCTURE: CPT

## 2023-12-05 PROCEDURE — 84100 ASSAY OF PHOSPHORUS: CPT | Performed by: EMERGENCY MEDICINE

## 2023-12-05 PROCEDURE — 85025 COMPLETE CBC W/AUTO DIFF WBC: CPT | Performed by: EMERGENCY MEDICINE

## 2023-12-05 PROCEDURE — 99285 EMERGENCY DEPT VISIT HI MDM: CPT

## 2023-12-05 PROCEDURE — 10004651 HB RX, NO CHARGE ITEM: Performed by: INTERNAL MEDICINE

## 2023-12-05 RX ORDER — LORAZEPAM 1 MG/1
3 TABLET ORAL
Status: DISCONTINUED | OUTPATIENT
Start: 2023-12-05 | End: 2023-12-07 | Stop reason: HOSPADM

## 2023-12-05 RX ORDER — LORAZEPAM 2 MG/ML
2 INJECTION INTRAMUSCULAR
Status: DISCONTINUED | OUTPATIENT
Start: 2023-12-05 | End: 2023-12-07 | Stop reason: HOSPADM

## 2023-12-05 RX ORDER — ESTRADIOL 0.1 MG/G
CREAM VAGINAL
COMMUNITY
Start: 2023-09-14

## 2023-12-05 RX ORDER — POTASSIUM CHLORIDE 20 MEQ/1
40 TABLET, EXTENDED RELEASE ORAL ONCE
Status: COMPLETED | OUTPATIENT
Start: 2023-12-06 | End: 2023-12-06

## 2023-12-05 RX ORDER — LORAZEPAM 2 MG/ML
2 INJECTION INTRAMUSCULAR ONCE
Status: COMPLETED | OUTPATIENT
Start: 2023-12-05 | End: 2023-12-05

## 2023-12-05 RX ORDER — LANOLIN ALCOHOL/MO/W.PET/CERES
3 CREAM (GRAM) TOPICAL NIGHTLY PRN
Status: DISCONTINUED | OUTPATIENT
Start: 2023-12-05 | End: 2023-12-07 | Stop reason: HOSPADM

## 2023-12-05 RX ORDER — LORAZEPAM 2 MG/ML
4 INJECTION INTRAMUSCULAR
Status: DISCONTINUED | OUTPATIENT
Start: 2023-12-05 | End: 2023-12-07 | Stop reason: HOSPADM

## 2023-12-05 RX ORDER — ACETAMINOPHEN 325 MG/1
650 TABLET ORAL EVERY 4 HOURS PRN
Status: DISCONTINUED | OUTPATIENT
Start: 2023-12-05 | End: 2023-12-07 | Stop reason: HOSPADM

## 2023-12-05 RX ORDER — BISACODYL 10 MG
10 SUPPOSITORY, RECTAL RECTAL DAILY PRN
Status: DISCONTINUED | OUTPATIENT
Start: 2023-12-05 | End: 2023-12-07 | Stop reason: HOSPADM

## 2023-12-05 RX ORDER — ONDANSETRON 4 MG/1
4 TABLET, ORALLY DISINTEGRATING ORAL EVERY 12 HOURS PRN
Status: DISCONTINUED | OUTPATIENT
Start: 2023-12-05 | End: 2023-12-07 | Stop reason: HOSPADM

## 2023-12-05 RX ORDER — FOLIC ACID 1 MG/1
1 TABLET ORAL DAILY
Status: DISCONTINUED | OUTPATIENT
Start: 2023-12-06 | End: 2023-12-07 | Stop reason: HOSPADM

## 2023-12-05 RX ORDER — ACETAMINOPHEN 650 MG/1
650 SUPPOSITORY RECTAL EVERY 4 HOURS PRN
Status: DISCONTINUED | OUTPATIENT
Start: 2023-12-05 | End: 2023-12-07 | Stop reason: HOSPADM

## 2023-12-05 RX ORDER — LEVETIRACETAM 500 MG/1
1000 TABLET ORAL 2 TIMES DAILY
Status: DISCONTINUED | OUTPATIENT
Start: 2023-12-05 | End: 2023-12-07 | Stop reason: HOSPADM

## 2023-12-05 RX ORDER — AMOXICILLIN 250 MG
2 CAPSULE ORAL 2 TIMES DAILY PRN
Status: DISCONTINUED | OUTPATIENT
Start: 2023-12-05 | End: 2023-12-07 | Stop reason: HOSPADM

## 2023-12-05 RX ORDER — ONDANSETRON 2 MG/ML
4 INJECTION INTRAMUSCULAR; INTRAVENOUS EVERY 6 HOURS PRN
Status: DISCONTINUED | OUTPATIENT
Start: 2023-12-05 | End: 2023-12-07 | Stop reason: HOSPADM

## 2023-12-05 RX ORDER — POLYETHYLENE GLYCOL 3350 17 G/17G
17 POWDER, FOR SOLUTION ORAL DAILY PRN
Status: DISCONTINUED | OUTPATIENT
Start: 2023-12-05 | End: 2023-12-07 | Stop reason: HOSPADM

## 2023-12-05 RX ORDER — LORAZEPAM 1 MG/1
2 TABLET ORAL
Status: DISCONTINUED | OUTPATIENT
Start: 2023-12-05 | End: 2023-12-07 | Stop reason: HOSPADM

## 2023-12-05 RX ORDER — CALCIUM CARBONATE 500 MG/1
500 TABLET, CHEWABLE ORAL EVERY 4 HOURS PRN
Status: DISCONTINUED | OUTPATIENT
Start: 2023-12-05 | End: 2023-12-07 | Stop reason: HOSPADM

## 2023-12-05 RX ORDER — PANTOPRAZOLE SODIUM 40 MG/1
40 TABLET, DELAYED RELEASE ORAL
Status: DISCONTINUED | OUTPATIENT
Start: 2023-12-06 | End: 2023-12-07 | Stop reason: HOSPADM

## 2023-12-05 RX ORDER — LEVETIRACETAM 100 MG/ML
10 SOLUTION ORAL 2 TIMES DAILY
COMMUNITY

## 2023-12-05 RX ORDER — 0.9 % SODIUM CHLORIDE 0.9 %
2 VIAL (ML) INJECTION EVERY 12 HOURS SCHEDULED
Status: DISCONTINUED | OUTPATIENT
Start: 2023-12-05 | End: 2023-12-07 | Stop reason: HOSPADM

## 2023-12-05 RX ORDER — LORAZEPAM 1 MG/1
4 TABLET ORAL
Status: DISCONTINUED | OUTPATIENT
Start: 2023-12-05 | End: 2023-12-07 | Stop reason: HOSPADM

## 2023-12-05 RX ORDER — FOLIC ACID 1 MG/1
1 TABLET ORAL ONCE
Status: COMPLETED | OUTPATIENT
Start: 2023-12-05 | End: 2023-12-05

## 2023-12-05 RX ORDER — OMEPRAZOLE 40 MG/1
40 CAPSULE, DELAYED RELEASE ORAL DAILY
COMMUNITY
Start: 2023-10-02

## 2023-12-05 RX ORDER — LORAZEPAM 2 MG/ML
3 INJECTION INTRAMUSCULAR
Status: DISCONTINUED | OUTPATIENT
Start: 2023-12-05 | End: 2023-12-07 | Stop reason: HOSPADM

## 2023-12-05 RX ORDER — METHENAMINE HIPPURATE 1000 MG/1
1 TABLET ORAL 2 TIMES DAILY
COMMUNITY

## 2023-12-05 RX ADMIN — MAGNESIUM SULFATE HEPTAHYDRATE 2 G: 40 INJECTION, SOLUTION INTRAVENOUS at 23:24

## 2023-12-05 RX ADMIN — SODIUM CHLORIDE, PRESERVATIVE FREE 2 ML: 5 INJECTION INTRAVENOUS at 23:09

## 2023-12-05 RX ADMIN — SODIUM CHLORIDE 1000 ML: 9 INJECTION, SOLUTION INTRAVENOUS at 20:06

## 2023-12-05 RX ADMIN — LORAZEPAM 2 MG: 2 INJECTION INTRAMUSCULAR; INTRAVENOUS at 20:07

## 2023-12-05 RX ADMIN — APIXABAN 5 MG: 5 TABLET, FILM COATED ORAL at 23:12

## 2023-12-05 RX ADMIN — MAGNESIUM SULFATE HEPTAHYDRATE 2 G: 40 INJECTION, SOLUTION INTRAVENOUS at 20:06

## 2023-12-05 RX ADMIN — LEVETIRACETAM 1000 MG: 500 TABLET, FILM COATED ORAL at 23:12

## 2023-12-05 RX ADMIN — FOLIC ACID 1 MG: 1 TABLET ORAL at 20:06

## 2023-12-05 RX ADMIN — THIAMINE HCL TAB 100 MG 100 MG: 100 TAB at 20:06

## 2023-12-05 RX ADMIN — METOPROLOL TARTRATE 25 MG: 25 TABLET, FILM COATED ORAL at 20:06

## 2023-12-05 SDOH — SOCIAL STABILITY: SOCIAL NETWORK: SUPPORT SYSTEMS: FAMILY MEMBERS

## 2023-12-05 SDOH — ECONOMIC STABILITY: HOUSING INSECURITY: WHAT IS YOUR LIVING SITUATION TODAY?: HOUSE

## 2023-12-05 SDOH — HEALTH STABILITY: PHYSICAL HEALTH: DO YOU HAVE DIFFICULTY DRESSING OR BATHING?: YES

## 2023-12-05 SDOH — SOCIAL STABILITY: SOCIAL NETWORK
HOW OFTEN DO YOU SEE OR TALK TO PEOPLE THAT YOU CARE ABOUT AND FEEL CLOSE TO? (FOR EXAMPLE: TALKING TO FRIENDS ON THE PHONE, VISITING FRIENDS OR FAMILY, GOING TO CHURCH OR CLUB MEETINGS): 5 OR MORE TIMES A WEEK

## 2023-12-05 SDOH — HEALTH STABILITY: GENERAL: BECAUSE OF A PHYSICAL, MENTAL, OR EMOTIONAL CONDITION, DO YOU HAVE DIFFICULTY DOING ERRANDS ALONE?: NO

## 2023-12-05 SDOH — ECONOMIC STABILITY: GENERAL

## 2023-12-05 SDOH — ECONOMIC STABILITY: FOOD INSECURITY: HOW OFTEN IN THE PAST 12 MONTHS WERE YOU WORRIED OR STRESSED ABOUT HAVING ENOUGH MONEY TO BUY NUTRITIOUS MEALS?: NEVER

## 2023-12-05 SDOH — HEALTH STABILITY: PHYSICAL HEALTH: DO YOU HAVE SERIOUS DIFFICULTY WALKING OR CLIMBING STAIRS?: YES

## 2023-12-05 SDOH — ECONOMIC STABILITY: HOUSING INSECURITY: WHAT IS YOUR LIVING SITUATION TODAY?: FAMILY MEMBERS;SPOUSE

## 2023-12-05 SDOH — ECONOMIC STABILITY: HOUSING INSECURITY: ARE YOU WORRIED ABOUT LOSING YOUR HOUSING?: NO

## 2023-12-05 ASSESSMENT — LIFESTYLE VARIABLES
AUDIT-C TOTAL SCORE: 0
HOW OFTEN DO YOU HAVE A DRINK CONTAINING ALCOHOL: NEVER
AGITATION: NORMAL ACTIVITY
HOW OFTEN DO YOU HAVE A DRINK CONTAINING ALCOHOL: NEVER
ALCOHOL_USE_STATUS: NO OR LOW RISK WITH VALIDATED TOOL
ANXIETY: NO ANXIETY, AT EASE
HOW MANY STANDARD DRINKS CONTAINING ALCOHOL DO YOU HAVE ON A TYPICAL DAY: 0,1 OR 2
PAROXYSMAL SWEATS: NO SWEAT VISIBLE
ANXIETY: NO ANXIETY, AT EASE
AGITATION: NORMAL ACTIVITY
HOW OFTEN DO YOU HAVE 6 OR MORE DRINKS ON ONE OCCASION: NEVER
AUDIT-C TOTAL SCORE: 0
TREMOR: MODERATE, WITH PATIENT'S ARMS EXTENDED
AGITATION: NORMAL ACTIVITY
ALCOHOL_USE_STATUS: NO OR LOW RISK WITH VALIDATED TOOL
VISUAL DISTURBANCES: NOT PRESENT
AUDITORY DISTURBANCES: NOT PRESENT
HEADACHE, FULLNESS IN HEAD: NOT PRESENT
AUDITORY DISTURBANCES: NOT PRESENT
TREMOR: NO TREMOR
HOW OFTEN DO YOU HAVE 6 OR MORE DRINKS ON ONE OCCASION: NEVER
NAUSEA AND VOMITING: MILD NAUSEA WITH NO VOMITING
HOW MANY STANDARD DRINKS CONTAINING ALCOHOL DO YOU HAVE ON A TYPICAL DAY: 0,1 OR 2
HEADACHE, FULLNESS IN HEAD: NOT PRESENT
VISUAL DISTURBANCES: NOT PRESENT
HEADACHE, FULLNESS IN HEAD: NOT PRESENT
NAUSEA AND VOMITING: NO NAUSEA AND NO VOMITING
TREMOR: NO TREMOR
PAROXYSMAL SWEATS: NO SWEAT VISIBLE
PAROXYSMAL SWEATS: NO SWEAT VISIBLE
VISUAL DISTURBANCES: NOT PRESENT
AUDITORY DISTURBANCES: NOT PRESENT
NAUSEA AND VOMITING: NO NAUSEA AND NO VOMITING
ANXIETY: NO ANXIETY, AT EASE

## 2023-12-05 ASSESSMENT — COLUMBIA-SUICIDE SEVERITY RATING SCALE - C-SSRS
2. HAVE YOU ACTUALLY HAD ANY THOUGHTS OF KILLING YOURSELF?: NO
IS THE PATIENT ABLE TO COMPLETE C-SSRS: YES
1. WITHIN THE PAST MONTH, HAVE YOU WISHED YOU WERE DEAD OR WISHED YOU COULD GO TO SLEEP AND NOT WAKE UP?: NO
6. HAVE YOU EVER DONE ANYTHING, STARTED TO DO ANYTHING, OR PREPARED TO DO ANYTHING TO END YOUR LIFE?: NO

## 2023-12-05 ASSESSMENT — ACTIVITIES OF DAILY LIVING (ADL)
ADL_SCORE: 7
ADL_BEFORE_ADMISSION: NEEDS/REQUIRES ASSISTANCE
ADL_BEFORE_ADMISSION: NEEDS/REQUIRES ASSISTANCE
BATHING: NEEDS ASSISTANCE
TOILETING: NEEDS ASSISTANCE
RECENT_DECLINE_ADL: NO
DRESSING: NEEDS ASSISTANCE
FEEDING: INDEPENDENT
FEEDING: INDEPENDENT
TOILETING: NEEDS ASSISTANCE
ADL_SCORE: 7
DRESSING: NEEDS ASSISTANCE
ADL_SHORT_OF_BREATH: NO
BATHING: NEEDS ASSISTANCE

## 2023-12-05 ASSESSMENT — PATIENT HEALTH QUESTIONNAIRE - PHQ9
1. LITTLE INTEREST OR PLEASURE IN DOING THINGS: NOT AT ALL
IS PATIENT ABLE TO COMPLETE PHQ2 OR PHQ9: YES
SUM OF ALL RESPONSES TO PHQ9 QUESTIONS 1 AND 2: 0
SUM OF ALL RESPONSES TO PHQ9 QUESTIONS 1 AND 2: 0
2. FEELING DOWN, DEPRESSED OR HOPELESS: NOT AT ALL
CLINICAL INTERPRETATION OF PHQ2 SCORE: NO FURTHER SCREENING NEEDED

## 2023-12-05 ASSESSMENT — PAIN SCALES - GENERAL
PAINLEVEL_OUTOF10: 0
PAINLEVEL_OUTOF10: 7

## 2023-12-05 ASSESSMENT — PAIN DESCRIPTION - PAIN TYPE: TYPE: ACUTE PAIN

## 2023-12-06 ENCOUNTER — APPOINTMENT (OUTPATIENT)
Dept: MRI IMAGING | Age: 58
End: 2023-12-06
Attending: FAMILY MEDICINE

## 2023-12-06 LAB
ALBUMIN SERPL-MCNC: 3.4 G/DL (ref 3.6–5.1)
ALBUMIN/GLOB SERPL: 0.9 {RATIO} (ref 1–2.4)
ALP SERPL-CCNC: 94 UNITS/L (ref 45–117)
ALT SERPL-CCNC: 51 UNITS/L
AMPHETAMINES UR QL SCN>500 NG/ML: NEGATIVE
ANION GAP SERPL CALC-SCNC: 13 MMOL/L (ref 7–19)
APPEARANCE UR: CLEAR
AST SERPL-CCNC: 72 UNITS/L
ATRIAL RATE (BPM): 104
ATRIAL RATE (BPM): 90
BACTERIA #/AREA URNS HPF: ABNORMAL /HPF
BARBITURATES UR QL SCN>200 NG/ML: NEGATIVE
BENZODIAZ UR QL SCN>200 NG/ML: NEGATIVE
BILIRUB SERPL-MCNC: 0.9 MG/DL (ref 0.2–1)
BILIRUB UR QL STRIP: NEGATIVE
BUN SERPL-MCNC: 18 MG/DL (ref 6–20)
BUN/CREAT SERPL: 33 (ref 7–25)
BZE UR QL SCN>150 NG/ML: NEGATIVE
CALCIUM SERPL-MCNC: 8.9 MG/DL (ref 8.4–10.2)
CANNABINOIDS UR QL SCN>50 NG/ML: NEGATIVE
CHLORIDE SERPL-SCNC: 103 MMOL/L (ref 97–110)
CO2 SERPL-SCNC: 26 MMOL/L (ref 21–32)
COLOR UR: YELLOW
CREAT SERPL-MCNC: 0.54 MG/DL (ref 0.51–0.95)
DEPRECATED RDW RBC: 46.7 FL (ref 39–50)
EGFRCR SERPLBLD CKD-EPI 2021: >90 ML/MIN/{1.73_M2}
ERYTHROCYTE [DISTWIDTH] IN BLOOD: 13.2 % (ref 11–15)
FASTING DURATION TIME PATIENT: ABNORMAL H
FENTANYL UR QL SCN: NEGATIVE
GLOBULIN SER-MCNC: 3.7 G/DL (ref 2–4)
GLUCOSE BLDC GLUCOMTR-MCNC: 110 MG/DL (ref 70–99)
GLUCOSE SERPL-MCNC: 86 MG/DL (ref 70–99)
GLUCOSE UR STRIP-MCNC: NEGATIVE MG/DL
HCT VFR BLD CALC: 31.3 % (ref 36–46.5)
HGB BLD-MCNC: 10 G/DL (ref 12–15.5)
HGB UR QL STRIP: NEGATIVE
HYALINE CASTS #/AREA URNS LPF: ABNORMAL /LPF
KETONES UR STRIP-MCNC: 15 MG/DL
LEUKOCYTE ESTERASE UR QL STRIP: NEGATIVE
MAGNESIUM SERPL-MCNC: 2 MG/DL (ref 1.7–2.4)
MCH RBC QN AUTO: 31 PG (ref 26–34)
MCHC RBC AUTO-ENTMCNC: 31.9 G/DL (ref 32–36.5)
MCV RBC AUTO: 96.9 FL (ref 78–100)
MUCOUS THREADS URNS QL MICRO: PRESENT
NITRITE UR QL STRIP: POSITIVE
NRBC BLD MANUAL-RTO: 0 /100 WBC
OPIATES UR QL SCN>300 NG/ML: NEGATIVE
P AXIS (DEGREES): 34
P AXIS (DEGREES): 59
PCP UR QL SCN>25 NG/ML: NEGATIVE
PH UR STRIP: 7 [PH] (ref 5–7)
PLATELET # BLD AUTO: 110 K/MCL (ref 140–450)
POTASSIUM SERPL-SCNC: 3.6 MMOL/L (ref 3.4–5.1)
PR-INTERVAL (MSEC): 128
PR-INTERVAL (MSEC): 90
PROT SERPL-MCNC: 7.1 G/DL (ref 6.4–8.2)
PROT UR STRIP-MCNC: ABNORMAL MG/DL
QRS-INTERVAL (MSEC): 84
QRS-INTERVAL (MSEC): 94
QT-INTERVAL (MSEC): 374
QT-INTERVAL (MSEC): 392
QTC: 480
QTC: 485
R AXIS (DEGREES): -10
R AXIS (DEGREES): -7
RAINBOW EXTRA TUBES HOLD SPECIMEN: NORMAL
RBC # BLD: 3.23 MIL/MCL (ref 4–5.2)
RBC #/AREA URNS HPF: ABNORMAL /HPF
REPORT TEXT: NORMAL
REPORT TEXT: NORMAL
SODIUM SERPL-SCNC: 138 MMOL/L (ref 135–145)
SP GR UR STRIP: 1.03 (ref 1–1.03)
SQUAMOUS #/AREA URNS HPF: ABNORMAL /HPF
T AXIS (DEGREES): 33
T AXIS (DEGREES): 47
UROBILINOGEN UR STRIP-MCNC: 0.2 MG/DL
VENTRICULAR RATE EKG/MIN (BPM): 101
VENTRICULAR RATE EKG/MIN (BPM): 90
WBC # BLD: 4.6 K/MCL (ref 4.2–11)
WBC #/AREA URNS HPF: ABNORMAL /HPF

## 2023-12-06 PROCEDURE — 10004651 HB RX, NO CHARGE ITEM: Performed by: INTERNAL MEDICINE

## 2023-12-06 PROCEDURE — 82962 GLUCOSE BLOOD TEST: CPT

## 2023-12-06 PROCEDURE — 80053 COMPREHEN METABOLIC PANEL: CPT | Performed by: INTERNAL MEDICINE

## 2023-12-06 PROCEDURE — 80307 DRUG TEST PRSMV CHEM ANLYZR: CPT | Performed by: INTERNAL MEDICINE

## 2023-12-06 PROCEDURE — G0378 HOSPITAL OBSERVATION PER HR: HCPCS

## 2023-12-06 PROCEDURE — 97165 OT EVAL LOW COMPLEX 30 MIN: CPT

## 2023-12-06 PROCEDURE — 97161 PT EVAL LOW COMPLEX 20 MIN: CPT

## 2023-12-06 PROCEDURE — 97530 THERAPEUTIC ACTIVITIES: CPT

## 2023-12-06 PROCEDURE — 36415 COLL VENOUS BLD VENIPUNCTURE: CPT | Performed by: INTERNAL MEDICINE

## 2023-12-06 PROCEDURE — 85027 COMPLETE CBC AUTOMATED: CPT | Performed by: INTERNAL MEDICINE

## 2023-12-06 PROCEDURE — 73721 MRI JNT OF LWR EXTRE W/O DYE: CPT

## 2023-12-06 PROCEDURE — 83735 ASSAY OF MAGNESIUM: CPT | Performed by: INTERNAL MEDICINE

## 2023-12-06 PROCEDURE — 97535 SELF CARE MNGMENT TRAINING: CPT

## 2023-12-06 PROCEDURE — 10002803 HB RX 637: Performed by: INTERNAL MEDICINE

## 2023-12-06 PROCEDURE — 81001 URINALYSIS AUTO W/SCOPE: CPT | Performed by: INTERNAL MEDICINE

## 2023-12-06 PROCEDURE — 87186 SC STD MICRODIL/AGAR DIL: CPT | Performed by: INTERNAL MEDICINE

## 2023-12-06 PROCEDURE — 99233 SBSQ HOSP IP/OBS HIGH 50: CPT | Performed by: FAMILY MEDICINE

## 2023-12-06 RX ORDER — POTASSIUM CHLORIDE 20 MEQ/1
40 TABLET, EXTENDED RELEASE ORAL ONCE
Status: COMPLETED | OUTPATIENT
Start: 2023-12-06 | End: 2023-12-06

## 2023-12-06 RX ADMIN — ACETAMINOPHEN 650 MG: 325 TABLET ORAL at 04:09

## 2023-12-06 RX ADMIN — POTASSIUM CHLORIDE 40 MEQ: 1500 TABLET, EXTENDED RELEASE ORAL at 08:12

## 2023-12-06 RX ADMIN — POTASSIUM CHLORIDE 40 MEQ: 1500 TABLET, EXTENDED RELEASE ORAL at 01:04

## 2023-12-06 RX ADMIN — SODIUM CHLORIDE, PRESERVATIVE FREE 2 ML: 5 INJECTION INTRAVENOUS at 08:14

## 2023-12-06 RX ADMIN — METOPROLOL TARTRATE 25 MG: 25 TABLET, FILM COATED ORAL at 08:12

## 2023-12-06 RX ADMIN — FOLIC ACID 1 MG: 1 TABLET ORAL at 08:11

## 2023-12-06 RX ADMIN — SODIUM CHLORIDE, PRESERVATIVE FREE 2 ML: 5 INJECTION INTRAVENOUS at 21:39

## 2023-12-06 RX ADMIN — LEVETIRACETAM 1000 MG: 500 TABLET, FILM COATED ORAL at 21:39

## 2023-12-06 RX ADMIN — THIAMINE HCL TAB 100 MG 100 MG: 100 TAB at 08:12

## 2023-12-06 RX ADMIN — METOPROLOL TARTRATE 25 MG: 25 TABLET, FILM COATED ORAL at 21:39

## 2023-12-06 RX ADMIN — APIXABAN 5 MG: 5 TABLET, FILM COATED ORAL at 08:12

## 2023-12-06 RX ADMIN — APIXABAN 5 MG: 5 TABLET, FILM COATED ORAL at 21:39

## 2023-12-06 RX ADMIN — LEVETIRACETAM 1000 MG: 500 TABLET, FILM COATED ORAL at 08:12

## 2023-12-06 RX ADMIN — PANTOPRAZOLE SODIUM 40 MG: 40 TABLET, DELAYED RELEASE ORAL at 06:35

## 2023-12-06 ASSESSMENT — LIFESTYLE VARIABLES
HEADACHE, FULLNESS IN HEAD: NOT PRESENT
HEADACHE, FULLNESS IN HEAD: NOT PRESENT
VISUAL DISTURBANCES: NOT PRESENT
ANXIETY: NO ANXIETY, AT EASE
VISUAL DISTURBANCES: NOT PRESENT
NAUSEA AND VOMITING: NO NAUSEA AND NO VOMITING
AUDITORY DISTURBANCES: NOT PRESENT
AUDITORY DISTURBANCES: NOT PRESENT
HEADACHE, FULLNESS IN HEAD: NOT PRESENT
VISUAL DISTURBANCES: NOT PRESENT
AGITATION: NORMAL ACTIVITY
HEADACHE, FULLNESS IN HEAD: NOT PRESENT
VISUAL DISTURBANCES: NOT PRESENT
TREMOR: NO TREMOR
PAROXYSMAL SWEATS: NO SWEAT VISIBLE
TREMOR: NO TREMOR
HEADACHE, FULLNESS IN HEAD: NOT PRESENT
AGITATION: NORMAL ACTIVITY
AUDITORY DISTURBANCES: NOT PRESENT
ANXIETY: NO ANXIETY, AT EASE
NAUSEA AND VOMITING: NO NAUSEA AND NO VOMITING
AUDITORY DISTURBANCES: NOT PRESENT
VISUAL DISTURBANCES: NOT PRESENT
PAROXYSMAL SWEATS: NO SWEAT VISIBLE
AGITATION: NORMAL ACTIVITY
TREMOR: NO TREMOR
VISUAL DISTURBANCES: NOT PRESENT
ANXIETY: NO ANXIETY, AT EASE
NAUSEA AND VOMITING: NO NAUSEA AND NO VOMITING
NAUSEA AND VOMITING: NO NAUSEA AND NO VOMITING
AGITATION: NORMAL ACTIVITY
ANXIETY: NO ANXIETY, AT EASE
NAUSEA AND VOMITING: NO NAUSEA AND NO VOMITING
TREMOR: NO TREMOR
ANXIETY: NO ANXIETY, AT EASE
ANXIETY: NO ANXIETY, AT EASE
AUDITORY DISTURBANCES: NOT PRESENT
NAUSEA AND VOMITING: NO NAUSEA AND NO VOMITING
TREMOR: NO TREMOR
AUDITORY DISTURBANCES: NOT PRESENT
NAUSEA AND VOMITING: NO NAUSEA AND NO VOMITING
PAROXYSMAL SWEATS: NO SWEAT VISIBLE
TREMOR: NO TREMOR
HEADACHE, FULLNESS IN HEAD: NOT PRESENT
PAROXYSMAL SWEATS: NO SWEAT VISIBLE
PAROXYSMAL SWEATS: NO SWEAT VISIBLE
ANXIETY: NO ANXIETY, AT EASE
AGITATION: NORMAL ACTIVITY
AGITATION: NORMAL ACTIVITY
AUDITORY DISTURBANCES: NOT PRESENT
AGITATION: NORMAL ACTIVITY
TREMOR: NO TREMOR
HEADACHE, FULLNESS IN HEAD: NOT PRESENT
VISUAL DISTURBANCES: NOT PRESENT

## 2023-12-06 ASSESSMENT — COGNITIVE AND FUNCTIONAL STATUS - GENERAL
BECAUSE OF A PHYSICAL, MENTAL, OR EMOTIONAL CONDITION, DO YOU HAVE DIFFICULTY DOING ERRANDS ALONE: NO
DAILY_ACTIVITY_RAW_SCORE: 23
DAILY_ACTIVITY_CONVERTED_SCORE: 51.12
DO YOU HAVE SERIOUS DIFFICULTY WALKING OR CLIMBING STAIRS: YES
HELP NEEDED FOR BATHING: A LITTLE
BASIC_MOBILITY_RAW_SCORE: 20
BECAUSE OF A PHYSICAL, MENTAL, OR EMOTIONAL CONDITION, DO YOU HAVE SERIOUS DIFFICULTY CONCENTRATING, REMEMBERING OR MAKING DECISIONS: NO
BASIC_MOBILITY_CONVERTED_SCORE: 43.99

## 2023-12-06 ASSESSMENT — ACTIVITIES OF DAILY LIVING (ADL)
HOME_MANAGEMENT_TIME_ENTRY: 10
PRIOR_ADL: INDEPENDENT

## 2023-12-06 ASSESSMENT — PAIN SCALES - GENERAL
PAINLEVEL_OUTOF10: 0
PAINLEVEL_OUTOF10: 10
PAINLEVEL_OUTOF10: 6
PAINLEVEL_OUTOF10: 3

## 2023-12-06 ASSESSMENT — PATIENT HEALTH QUESTIONNAIRE - PHQ9
SUM OF ALL RESPONSES TO PHQ9 QUESTIONS 1 AND 2: 0
CLINICAL INTERPRETATION OF PHQ2 SCORE: NO FURTHER SCREENING NEEDED

## 2023-12-06 ASSESSMENT — ENCOUNTER SYMPTOMS: PAIN SEVERITY NOW: 3

## 2023-12-07 VITALS
OXYGEN SATURATION: 97 % | TEMPERATURE: 98.4 F | RESPIRATION RATE: 16 BRPM | BODY MASS INDEX: 22.22 KG/M2 | SYSTOLIC BLOOD PRESSURE: 114 MMHG | HEART RATE: 91 BPM | HEIGHT: 65 IN | WEIGHT: 133.38 LBS | DIASTOLIC BLOOD PRESSURE: 75 MMHG

## 2023-12-07 LAB — POTASSIUM SERPL-SCNC: 3.7 MMOL/L (ref 3.4–5.1)

## 2023-12-07 PROCEDURE — 36415 COLL VENOUS BLD VENIPUNCTURE: CPT | Performed by: INTERNAL MEDICINE

## 2023-12-07 PROCEDURE — 97530 THERAPEUTIC ACTIVITIES: CPT

## 2023-12-07 PROCEDURE — 10004651 HB RX, NO CHARGE ITEM: Performed by: INTERNAL MEDICINE

## 2023-12-07 PROCEDURE — 10002803 HB RX 637: Performed by: INTERNAL MEDICINE

## 2023-12-07 PROCEDURE — 84132 ASSAY OF SERUM POTASSIUM: CPT | Performed by: INTERNAL MEDICINE

## 2023-12-07 PROCEDURE — G0378 HOSPITAL OBSERVATION PER HR: HCPCS

## 2023-12-07 PROCEDURE — 99239 HOSP IP/OBS DSCHRG MGMT >30: CPT | Performed by: FAMILY MEDICINE

## 2023-12-07 RX ORDER — POTASSIUM CHLORIDE 20 MEQ/1
40 TABLET, EXTENDED RELEASE ORAL ONCE
Status: DISCONTINUED | OUTPATIENT
Start: 2023-12-07 | End: 2023-12-07 | Stop reason: HOSPADM

## 2023-12-07 RX ADMIN — SODIUM CHLORIDE, PRESERVATIVE FREE 2 ML: 5 INJECTION INTRAVENOUS at 08:03

## 2023-12-07 RX ADMIN — METOPROLOL TARTRATE 25 MG: 25 TABLET, FILM COATED ORAL at 08:03

## 2023-12-07 RX ADMIN — FOLIC ACID 1 MG: 1 TABLET ORAL at 08:03

## 2023-12-07 RX ADMIN — LEVETIRACETAM 1000 MG: 500 TABLET, FILM COATED ORAL at 08:03

## 2023-12-07 RX ADMIN — PANTOPRAZOLE SODIUM 40 MG: 40 TABLET, DELAYED RELEASE ORAL at 06:15

## 2023-12-07 RX ADMIN — THIAMINE HCL TAB 100 MG 100 MG: 100 TAB at 08:03

## 2023-12-07 RX ADMIN — APIXABAN 5 MG: 5 TABLET, FILM COATED ORAL at 08:03

## 2023-12-07 ASSESSMENT — LIFESTYLE VARIABLES
HEADACHE, FULLNESS IN HEAD: NOT PRESENT
VISUAL DISTURBANCES: NOT PRESENT
HEADACHE, FULLNESS IN HEAD: NOT PRESENT
ANXIETY: NO ANXIETY, AT EASE
PAROXYSMAL SWEATS: NO SWEAT VISIBLE
AGITATION: NORMAL ACTIVITY
AGITATION: NORMAL ACTIVITY
PAROXYSMAL SWEATS: NO SWEAT VISIBLE
ANXIETY: NO ANXIETY, AT EASE
TREMOR: NO TREMOR
TREMOR: NO TREMOR
VISUAL DISTURBANCES: NOT PRESENT
AUDITORY DISTURBANCES: NOT PRESENT
NAUSEA AND VOMITING: NO NAUSEA AND NO VOMITING
NAUSEA AND VOMITING: NO NAUSEA AND NO VOMITING
AUDITORY DISTURBANCES: NOT PRESENT

## 2023-12-07 ASSESSMENT — COGNITIVE AND FUNCTIONAL STATUS - GENERAL
BASIC_MOBILITY_CONVERTED_SCORE: 47.40
BASIC_MOBILITY_RAW_SCORE: 22

## 2023-12-07 ASSESSMENT — PAIN SCALES - GENERAL: PAINLEVEL_OUTOF10: 0

## 2023-12-08 LAB — BACTERIA UR CULT: ABNORMAL

## 2023-12-11 ENCOUNTER — HOSPITAL ENCOUNTER (OUTPATIENT)
Dept: PHYSICAL MEDICINE AND REHAB | Age: 58
Discharge: STILL ADMITTED OR ADMINISTRATIVE | End: 2023-12-11
Attending: FAMILY MEDICINE

## 2023-12-11 PROCEDURE — 97110 THERAPEUTIC EXERCISES: CPT

## 2023-12-11 PROCEDURE — 97161 PT EVAL LOW COMPLEX 20 MIN: CPT

## 2023-12-11 ASSESSMENT — MOVEMENT AND STRENGTH ASSESSMENTS
ANY OF YOUR USUAL WORK, HOUSEWORK OR SCHOOL ACTIVITIES: A LITTLE BIT OF DIFFICULTY
HOPPING: EXTREME DIFFICULTY OR UNABLE TO PERFORM ACTIVITY
GETTING INTO OR OUT OF THE BATH: MODERATE DIFFICULTY
PERFORMING LIGHT ACTIVITES AROUND YOUR HOME: A LITTLE BIT OF DIFFICULTY
GETTING INTO OR OUT OF A CAR: MODERATE DIFFICULTY
RUNNING ON EVEN GROUND: EXTREME DIFFICULTY OR UNABLE TO PERFORM ACTIVITY
LIFTING AN OBJECT, LIKE A BAG OF GROCERIES, FROM THE FLOOR: A LITTLE BIT OF DIFFICULTY
SITTING FOR 1 HOUR: A LITTLE BIT OF DIFFICULTY
PUTTING ON YOUR SHOES OR SOCKS: A LITTLE BIT OF DIFFICULTY
YOUR USUAL HOBBIES, RECREATIONAL OR SPORTING ACTIVIITIES: MODERATE DIFFICULTY
WALKING BETWEEN ROOMS: A LITTLE BIT OF DIFFICULTY
RUNNING ON UNEVEN GROUND: EXTREME DIFFICULTY OR UNABLE TO PERFORM ACTIVITY
WALKING A MILE: QUITE A BIT OF DIFFICULTY
WALKING 2 BLOCKS: MODERATE DIFFICULTY
PERFORMING HEAVY ACTIVITIES AROUND YOUR HOME: MODERATE DIFFICULTY
ROLLING OVER IN BED: A LITTLE BIT OF DIFFICULTY
MAKING SHARP TURNS WHILE RUNNING FAST: EXTREME DIFFICULTY OR UNABLE TO PERFORM ACTIVITY
GOING UP OR DOWN 10 STAIRS (ABOUT 1 FLIGHT OF STAIRS): MODERATE DIFFICULTY
TOTAL SCORE: 47.5
SQUATTING: A LITTLE BIT OF DIFFICULTY
STANDING FOR 1 HOUR: QUITE A BIT OF DIFFICULTY

## 2023-12-11 ASSESSMENT — ENCOUNTER SYMPTOMS
ALLEVIATING FACTORS: AVOIDING MOVEMENT IN INVOLVED AREA
PAIN SCALE AT LOWEST: 4
PAIN SEVERITY NOW: 4
PAIN FREQUENCY: CONSTANT
ALLEVIATING FACTORS: CHANGE IN POSITION
QUALITY: SORE
QUALITY: DISCOMFORT
ALLEVIATING FACTORS: REST
SUBJECTIVE PAIN PROGRESSION: IMPROVED
PAIN SCALE AT HIGHEST: 10
QUALITY: STIFF
ALLEVIATING FACTORS: PRESCRIPTION MEDICATIONS

## 2023-12-13 ENCOUNTER — HOSPITAL ENCOUNTER (OUTPATIENT)
Dept: PHYSICAL MEDICINE AND REHAB | Age: 58
Discharge: STILL ADMITTED OR ADMINISTRATIVE | End: 2023-12-13
Attending: FAMILY MEDICINE

## 2023-12-13 PROCEDURE — 97110 THERAPEUTIC EXERCISES: CPT

## 2023-12-13 ASSESSMENT — ENCOUNTER SYMPTOMS: PAIN SEVERITY NOW: 4

## 2023-12-18 ENCOUNTER — APPOINTMENT (OUTPATIENT)
Dept: PHYSICAL MEDICINE AND REHAB | Age: 58
End: 2023-12-18
Attending: FAMILY MEDICINE

## 2023-12-20 ENCOUNTER — APPOINTMENT (OUTPATIENT)
Dept: PHYSICAL MEDICINE AND REHAB | Age: 58
End: 2023-12-20
Attending: FAMILY MEDICINE

## 2023-12-27 ENCOUNTER — APPOINTMENT (OUTPATIENT)
Dept: PHYSICAL MEDICINE AND REHAB | Age: 58
End: 2023-12-27
Attending: FAMILY MEDICINE

## 2024-01-24 ENCOUNTER — APPOINTMENT (OUTPATIENT)
Dept: CT IMAGING | Facility: HOSPITAL | Age: 59
End: 2024-01-24
Attending: EMERGENCY MEDICINE
Payer: COMMERCIAL

## 2024-01-24 ENCOUNTER — APPOINTMENT (OUTPATIENT)
Dept: MRI IMAGING | Facility: HOSPITAL | Age: 59
End: 2024-01-24
Attending: ORTHOPAEDIC SURGERY
Payer: COMMERCIAL

## 2024-01-24 ENCOUNTER — APPOINTMENT (OUTPATIENT)
Dept: GENERAL RADIOLOGY | Facility: HOSPITAL | Age: 59
End: 2024-01-24
Attending: EMERGENCY MEDICINE
Payer: COMMERCIAL

## 2024-01-24 ENCOUNTER — HOSPITAL ENCOUNTER (INPATIENT)
Facility: HOSPITAL | Age: 59
LOS: 5 days | Discharge: HOME | End: 2024-01-29
Attending: EMERGENCY MEDICINE | Admitting: INTERNAL MEDICINE
Payer: COMMERCIAL

## 2024-01-24 DIAGNOSIS — S72.002A CLOSED FRACTURE OF NECK OF LEFT FEMUR, INITIAL ENCOUNTER (HCC): Primary | ICD-10-CM

## 2024-01-24 LAB
ALBUMIN SERPL-MCNC: 3.8 G/DL (ref 3.2–4.8)
ALBUMIN/GLOB SERPL: 1.2 {RATIO} (ref 1–2)
ALP LIVER SERPL-CCNC: 88 U/L
ALT SERPL-CCNC: 19 U/L
ANION GAP SERPL CALC-SCNC: 9 MMOL/L (ref 0–18)
AST SERPL-CCNC: 35 U/L (ref ?–34)
ATRIAL RATE: 86 BPM
BASOPHILS # BLD AUTO: 0.04 X10(3) UL (ref 0–0.2)
BASOPHILS NFR BLD AUTO: 0.6 %
BILIRUB SERPL-MCNC: 0.2 MG/DL (ref 0.3–1.2)
BILIRUB UR QL: NEGATIVE
BUN BLD-MCNC: 8 MG/DL (ref 9–23)
BUN/CREAT SERPL: 11.9 (ref 10–20)
CALCIUM BLD-MCNC: 9.4 MG/DL (ref 8.7–10.4)
CHLORIDE SERPL-SCNC: 107 MMOL/L (ref 98–112)
CLARITY UR: CLEAR
CO2 SERPL-SCNC: 27 MMOL/L (ref 21–32)
COLOR UR: YELLOW
CREAT BLD-MCNC: 0.67 MG/DL
DEPRECATED RDW RBC AUTO: 48.6 FL (ref 35.1–46.3)
EGFRCR SERPLBLD CKD-EPI 2021: 101 ML/MIN/1.73M2 (ref 60–?)
EOSINOPHIL # BLD AUTO: 0.18 X10(3) UL (ref 0–0.7)
EOSINOPHIL NFR BLD AUTO: 2.5 %
ERYTHROCYTE [DISTWIDTH] IN BLOOD BY AUTOMATED COUNT: 14.5 % (ref 11–15)
GLOBULIN PLAS-MCNC: 3.3 G/DL (ref 2.8–4.4)
GLUCOSE BLD-MCNC: 97 MG/DL (ref 70–99)
GLUCOSE BLDC GLUCOMTR-MCNC: 99 MG/DL (ref 70–99)
GLUCOSE UR-MCNC: NORMAL MG/DL
HCT VFR BLD AUTO: 31.6 %
HGB BLD-MCNC: 10.2 G/DL
HGB UR QL STRIP.AUTO: NEGATIVE
IMM GRANULOCYTES # BLD AUTO: 0.02 X10(3) UL (ref 0–1)
IMM GRANULOCYTES NFR BLD: 0.3 %
KETONES UR-MCNC: NEGATIVE MG/DL
LEUKOCYTE ESTERASE UR QL STRIP.AUTO: 75
LYMPHOCYTES # BLD AUTO: 1.3 X10(3) UL (ref 1–4)
LYMPHOCYTES NFR BLD AUTO: 18.1 %
MAGNESIUM SERPL-MCNC: 1.5 MG/DL (ref 1.6–2.6)
MCH RBC QN AUTO: 29.5 PG (ref 26–34)
MCHC RBC AUTO-ENTMCNC: 32.3 G/DL (ref 31–37)
MCV RBC AUTO: 91.3 FL
MONOCYTES # BLD AUTO: 0.64 X10(3) UL (ref 0.1–1)
MONOCYTES NFR BLD AUTO: 8.9 %
MRSA NASAL: NEGATIVE
NEUTROPHILS # BLD AUTO: 5.01 X10 (3) UL (ref 1.5–7.7)
NEUTROPHILS # BLD AUTO: 5.01 X10(3) UL (ref 1.5–7.7)
NEUTROPHILS NFR BLD AUTO: 69.6 %
NITRITE UR QL STRIP.AUTO: NEGATIVE
OSMOLALITY SERPL CALC.SUM OF ELEC: 294 MOSM/KG (ref 275–295)
P AXIS: 39 DEGREES
P-R INTERVAL: 134 MS
PH UR: 7.5 [PH] (ref 5–8)
PLATELET # BLD AUTO: 348 10(3)UL (ref 150–450)
POTASSIUM SERPL-SCNC: 4.2 MMOL/L (ref 3.5–5.1)
PROT SERPL-MCNC: 7.1 G/DL (ref 5.7–8.2)
PROT UR-MCNC: 20 MG/DL
Q-T INTERVAL: 394 MS
QRS DURATION: 72 MS
QTC CALCULATION (BEZET): 471 MS
R AXIS: -2 DEGREES
RBC # BLD AUTO: 3.46 X10(6)UL
SODIUM SERPL-SCNC: 143 MMOL/L (ref 136–145)
SP GR UR STRIP: 1.02 (ref 1–1.03)
STAPH A BY PCR: NEGATIVE
T AXIS: 25 DEGREES
TROPONIN I SERPL HS-MCNC: <3 NG/L
UROBILINOGEN UR STRIP-ACNC: NORMAL
VENTRICULAR RATE: 86 BPM
WBC # BLD AUTO: 7.2 X10(3) UL (ref 4–11)

## 2024-01-24 PROCEDURE — 83735 ASSAY OF MAGNESIUM: CPT | Performed by: EMERGENCY MEDICINE

## 2024-01-24 PROCEDURE — 93005 ELECTROCARDIOGRAM TRACING: CPT

## 2024-01-24 PROCEDURE — 70450 CT HEAD/BRAIN W/O DYE: CPT | Performed by: EMERGENCY MEDICINE

## 2024-01-24 PROCEDURE — 96374 THER/PROPH/DIAG INJ IV PUSH: CPT

## 2024-01-24 PROCEDURE — 71045 X-RAY EXAM CHEST 1 VIEW: CPT | Performed by: EMERGENCY MEDICINE

## 2024-01-24 PROCEDURE — 73502 X-RAY EXAM HIP UNI 2-3 VIEWS: CPT | Performed by: EMERGENCY MEDICINE

## 2024-01-24 PROCEDURE — 84484 ASSAY OF TROPONIN QUANT: CPT | Performed by: EMERGENCY MEDICINE

## 2024-01-24 PROCEDURE — 73700 CT LOWER EXTREMITY W/O DYE: CPT | Performed by: EMERGENCY MEDICINE

## 2024-01-24 PROCEDURE — 80053 COMPREHEN METABOLIC PANEL: CPT | Performed by: EMERGENCY MEDICINE

## 2024-01-24 PROCEDURE — 73721 MRI JNT OF LWR EXTRE W/O DYE: CPT | Performed by: ORTHOPAEDIC SURGERY

## 2024-01-24 PROCEDURE — 71101 X-RAY EXAM UNILAT RIBS/CHEST: CPT | Performed by: EMERGENCY MEDICINE

## 2024-01-24 PROCEDURE — 87640 STAPH A DNA AMP PROBE: CPT | Performed by: EMERGENCY MEDICINE

## 2024-01-24 PROCEDURE — 99285 EMERGENCY DEPT VISIT HI MDM: CPT

## 2024-01-24 PROCEDURE — 82962 GLUCOSE BLOOD TEST: CPT

## 2024-01-24 PROCEDURE — 81001 URINALYSIS AUTO W/SCOPE: CPT | Performed by: EMERGENCY MEDICINE

## 2024-01-24 PROCEDURE — 85025 COMPLETE CBC W/AUTO DIFF WBC: CPT | Performed by: EMERGENCY MEDICINE

## 2024-01-24 PROCEDURE — 96376 TX/PRO/DX INJ SAME DRUG ADON: CPT

## 2024-01-24 PROCEDURE — 87641 MR-STAPH DNA AMP PROBE: CPT | Performed by: EMERGENCY MEDICINE

## 2024-01-24 PROCEDURE — 93010 ELECTROCARDIOGRAM REPORT: CPT

## 2024-01-24 RX ORDER — MAGNESIUM OXIDE 400 MG/1
200 TABLET ORAL ONCE
Status: COMPLETED | OUTPATIENT
Start: 2024-01-24 | End: 2024-01-24

## 2024-01-24 RX ORDER — MORPHINE SULFATE 2 MG/ML
2 INJECTION, SOLUTION INTRAMUSCULAR; INTRAVENOUS ONCE
Status: COMPLETED | OUTPATIENT
Start: 2024-01-24 | End: 2024-01-24

## 2024-01-24 RX ORDER — MORPHINE SULFATE 2 MG/ML
1 INJECTION, SOLUTION INTRAMUSCULAR; INTRAVENOUS EVERY 4 HOURS PRN
Status: DISCONTINUED | OUTPATIENT
Start: 2024-01-24 | End: 2024-01-24

## 2024-01-24 RX ORDER — OMEPRAZOLE 20 MG/1
40 CAPSULE, DELAYED RELEASE ORAL
COMMUNITY

## 2024-01-24 RX ORDER — METOPROLOL SUCCINATE 25 MG/1
25 TABLET, EXTENDED RELEASE ORAL DAILY
COMMUNITY
End: 2024-01-29

## 2024-01-24 RX ORDER — LEVETIRACETAM 100 MG/ML
10 SOLUTION ORAL 2 TIMES DAILY
COMMUNITY

## 2024-01-24 RX ORDER — METHENAMINE HIPPURATE 1000 MG/1
1 TABLET ORAL 2 TIMES DAILY
COMMUNITY

## 2024-01-24 RX ORDER — ATORVASTATIN CALCIUM 20 MG/1
20 TABLET, FILM COATED ORAL NIGHTLY
COMMUNITY

## 2024-01-24 RX ORDER — LEVETIRACETAM 500 MG/1
1000 TABLET ORAL 2 TIMES DAILY
Status: DISCONTINUED | OUTPATIENT
Start: 2024-01-24 | End: 2024-01-29

## 2024-01-24 RX ORDER — MORPHINE SULFATE 2 MG/ML
1 INJECTION, SOLUTION INTRAMUSCULAR; INTRAVENOUS EVERY 2 HOUR PRN
Status: DISCONTINUED | OUTPATIENT
Start: 2024-01-24 | End: 2024-01-29

## 2024-01-24 RX ORDER — SODIUM CHLORIDE 9 MG/ML
1000 INJECTION, SOLUTION INTRAVENOUS ONCE
Status: COMPLETED | OUTPATIENT
Start: 2024-01-24 | End: 2024-01-24

## 2024-01-24 NOTE — ED PROVIDER NOTES
Patient Seen in: [facility] Emergency Department      History     Chief Complaint   Patient presents with    Syncope    Hip Pain     Stated Complaint: syncope    Subjective:   HPI  58-year-old female with history of seizure disorder on Keppra, anemia, hypertension, atrial fibrillation on DOAC, presents for evaluation of fall.  She got up in the middle the night to use the bathroom and fell.  She is unsure how she fell.  Her family members heard the fall and came to find her on the ground with her eyes rolled back.  After the fall she had significant left hip pain and was not able to bear any weight.  She still complains of left hip pain and left rib pain.  No loss of strength or sensation.  No chest pain or dyspnea.      Objective:   Past Medical History:   Diagnosis Date    Irritable bowel syndrome     OTHER DISEASES     pain in knee    Seizure disorder (HCC)               Past Surgical History:   Procedure Laterality Date    COLONOSCOPY,BIOPSY  3/25/09    Performed by HAYLEY HOBBS at [facility]    KNEE SCOPE,MED&LAT MENIS SHAV  3/21/2014    Procedure: ARTHROSCOPY LEFT KNEE W/ MEDIAL MENISCECTOMY;  Surgeon: Russell Thomas MD;  Location: [facility]    OTHER SURGICAL HISTORY      orif right ankle    OTHER SURGICAL HISTORY      orif hip right    OTHER SURGICAL HISTORY  3    left knee a pmm/plm                Social History     Socioeconomic History    Marital status:    Tobacco Use    Smoking status: Former     Years: 10     Types: Cigarettes     Quit date: 2003     Years since quittin.0    Smokeless tobacco: Never   Vaping Use    Vaping Use: Never used   Substance and Sexual Activity    Alcohol use: Yes     Comment: 2x week    Drug use: No              Review of Systems    Positive for stated complaint: syncope  Other systems are as noted in HPI.  Constitutional and vital signs reviewed.      All other systems reviewed and negative except as noted  above.    Physical Exam     ED Triage Vitals [01/24/24 0956]   /72   Pulse 82   Resp 12   Temp 98.6 °F (37 °C)   Temp src Oral   SpO2 95 %   O2 Device None (Room air)       Current:/67   Pulse 89   Temp 98.6 °F (37 °C) (Oral)   Resp 17   Ht 165.1 cm (5' 5\")   Wt 59 kg   SpO2 95%   BMI 21.63 kg/m²         Physical Exam  Vitals and nursing note reviewed.   Constitutional:       Appearance: She is well-developed.   HENT:      Head: Normocephalic and atraumatic.      Nose: Nose normal.      Mouth/Throat:      Mouth: Mucous membranes are moist.      Pharynx: Oropharynx is clear.   Eyes:      Extraocular Movements: Extraocular movements intact.      Pupils: Pupils are equal, round, and reactive to light.   Cardiovascular:      Rate and Rhythm: Normal rate and regular rhythm.      Heart sounds: Normal heart sounds.      Comments: DP and PT pulses are 2+  Pulmonary:      Effort: Pulmonary effort is normal.      Breath sounds: Normal breath sounds.   Abdominal:      General: There is no distension.      Palpations: Abdomen is soft.      Tenderness: There is no abdominal tenderness. There is no right CVA tenderness or left CVA tenderness.   Musculoskeletal:      Cervical back: Normal range of motion.      Comments: Significant tenderness to palpation at the left greater trochanter with diminished range of motion of the left hip secondary to pain   Skin:     General: Skin is warm.      Capillary Refill: Capillary refill takes less than 2 seconds.   Neurological:      General: No focal deficit present.      Mental Status: She is alert.      Comments: No focal deficits       Differential diagnosis includes but is not limited to fracture, dislocation, contusion, rib fracture, arrhythmia        ED Course     Labs Reviewed   COMP METABOLIC PANEL (14) - Abnormal; Notable for the following components:       Result Value    BUN 8 (*)     AST 35 (*)     Bilirubin, Total 0.2 (*)     All other components within normal  limits   MAGNESIUM - Abnormal; Notable for the following components:    Magnesium 1.5 (*)     All other components within normal limits   URINALYSIS, ROUTINE - Abnormal; Notable for the following components:    Protein Urine 20 (*)     Leukocyte Esterase Urine 75 (*)     WBC Urine 11-20 (*)     Squamous Epi. Cells Few (*)     All other components within normal limits   CBC W/ DIFFERENTIAL - Abnormal; Notable for the following components:    RBC 3.46 (*)     HGB 10.2 (*)     HCT 31.6 (*)     RDW-SD 48.6 (*)     All other components within normal limits   TROPONIN I HIGH SENSITIVITY - Normal   POCT GLUCOSE - Normal   CBC WITH DIFFERENTIAL WITH PLATELET    Narrative:     The following orders were created for panel order CBC With Differential With Platelet.  Procedure                               Abnormality         Status                     ---------                               -----------         ------                     CBC W/ DIFFERENTIAL[[id number]]          Abnormal            Final result                 Please view results for these tests on the individual orders.   Root Orange DRAW BLUE   Root Orange DRAW GOLD   MRSA/SA SCRN BY PCR:[facility] SURG ONLY     EKG    Rate, intervals and axes as noted on EKG Report.  Rate: 86  Rhythm: Sinus Rhythm  Reading: No STEMI, no ectopy              ED Course as of 01/24/24 1555  ------------------------------------------------------------  Time: 01/24 1041  Comment: CBC with slight anemia  ------------------------------------------------------------  Time: 01/24 1041  Comment: CMP normal lites, AST of 35  ------------------------------------------------------------  Time: 01/24 1041  Comment: Slight hypomagnesemia at 1.5 which will be repleted orally  ------------------------------------------------------------  Time: 01/24 1042  Comment: High-sensitivity troponin negative     XR CHEST AP PORTABLE  (CPT=71045)    Result Date: 1/24/2024  CONCLUSION:  1. No acute disease in the  chest.  Minimal linear left basal scarring/atelectasis.  Old healed bilateral rib fractures.    Dictated by (CST): Apolinar Galloway MD on 1/24/2024 at 1:13 PM     Finalized by (CST): Apolinar Galloway MD on 1/24/2024 at 1:18 PM          CT HIP(BONE) LEFT (CPT=73700)    Result Date: 1/24/2024  CONCLUSION:   Incomplete nondisplaced fracture at the posterior femoral neck.  Comminuted nondisplaced fracture of the greater trochanter.  Mild left hip osteoarthritis.  This report was communicated by telephone to Dr. Mckeon on 1/24/2024 at 1235 hours.        Dictated by (CST): Lorenzo Eastman MD on 1/24/2024 at 12:30 PM     Finalized by (CST): Lorenzo Eastman MD on 1/24/2024 at 12:36 PM          XR RIBS WITH CHEST (3 VIEWS), LEFT  (CPT=71101)    Result Date: 1/24/2024  CONCLUSION:  1. No acute appearing left rib fractures.  Chronic healed rib fracture deformities on the left as described above.  Additional less well visualized fractures of the posterolateral right 3rd and 4th ribs.  Correlate clinically.    Dictated by (CST): Apolinar Galloway MD on 1/24/2024 at 11:25 AM     Finalized by (CST): Apolinar Galloway MD on 1/24/2024 at 11:30 AM          XR HIP W OR WO PELVIS 2 OR 3 VIEWS, LEFT (CPT=73502)    Result Date: 1/24/2024  CONCLUSION:  1. There is a poorly visualized incomplete appearing vertical fracture through the lateral aspect of the left greater trochanter of the left hip extending inferiorly to the subtrochanteric region of the proximal left femoral shaft.  This is incomplete and nondisplaced.  Recommend CT scanning to further assess its true extent. 2. Chronic fracture deformity of the superior pubic ramus on the right.  3. Lesser findings as described above.    Dictated by (CST): Apolinar Galloway MD on 1/24/2024 at 11:19 AM     Finalized by (CST): Apolinar Galloway MD on 1/24/2024 at 11:24 AM          CT BRAIN OR HEAD (28044)    Result Date: 1/24/2024  CONCLUSION:  1. No acute intracranial process by noncontrast CT technique. 2. Mild  scattered intracranial atherosclerosis. 3. Left sphenoid sinus mucosal thickening. 4. Lesser incidental findings as above.   Dictated by (CST): Milad Holt MD on 1/24/2024 at 11:20 AM     Finalized by (CST): Milad Holt MD on 1/24/2024 at 11:22 AM                  [facility]                     Medical Decision Making  Vitals stable in the ED.  No evidence of neurovascular compromise.  Labs reviewed as above along with EKG.  Per my independent interpretation of CT brain there is no ICH.  Hip CT concerning for femoral neck fracture and greater trochanteric fracture.  Spoke with Dr. Edouard orthopedic surgery consultation who will plan for operative repair tomorrow.  Discussed with Dr. Landry for admission.    Problems Addressed:  Closed fracture of neck of left femur, initial encounter ([facility]): complicated acute illness or injury with systemic symptoms    Amount and/or Complexity of Data Reviewed  Labs: ordered. Decision-making details documented in ED Course.  Radiology: ordered and independent interpretation performed. Decision-making details documented in ED Course.  ECG/medicine tests: ordered and independent interpretation performed. Decision-making details documented in ED Course.  Discussion of management or test interpretation with external provider(s): As above    Risk  Parenteral controlled substances.  Decision regarding hospitalization.        Disposition and Plan     Clinical Impression:  1. Closed fracture of neck of left femur, initial encounter ([facility])         Disposition:  Admit  1/24/2024  1:38 pm    Follow-up:  No follow-up provider specified.        Medications Prescribed:  Current Discharge Medication List                            Hospital Problems       Present on Admission  Date Reviewed: 3/31/2022            ICD-10-CM Noted POA    * (Principal) Closed fracture of neck of left femur, initial encounter ([facility]) S72.002A 1/24/2024 Unknown

## 2024-01-24 NOTE — ED INITIAL ASSESSMENT (HPI)
Pt to ED from home after a syncopal episode last night. States she woke up to use the bathroom and was suddenly on the ground. States her family members heard her fall and came to find her on the floor with her eyes rolled back. States she was not out for very long. Unsure if she hit her head. Denies head/neck pain. On Eloquis d/t afib. C/o left hip pain and left sided rib pain. A/Ox4    Hx seizures

## 2024-01-24 NOTE — ED QUICK NOTES
Orders for admission, patient is aware of plan and ready to go upstairs. Any questions, please call ED KATHIA Du at extension [phone].     Patient Covid vaccination status: Fully vaccinated     COVID Test Ordered in ED: None    COVID Suspicion at Admission: N/A    Running Infusions:  None    Mental Status/LOC at time of transport: A/Ox4    Other pertinent information: Independent and ambulatory from home  CIWA score: N/A   NIH score:  N/A

## 2024-01-25 ENCOUNTER — ANESTHESIA (OUTPATIENT)
Dept: SURGERY | Facility: HOSPITAL | Age: 59
End: 2024-01-25
Payer: COMMERCIAL

## 2024-01-25 ENCOUNTER — ANESTHESIA EVENT (OUTPATIENT)
Dept: SURGERY | Facility: HOSPITAL | Age: 59
End: 2024-01-25
Payer: COMMERCIAL

## 2024-01-25 ENCOUNTER — APPOINTMENT (OUTPATIENT)
Dept: GENERAL RADIOLOGY | Facility: HOSPITAL | Age: 59
End: 2024-01-25
Attending: STUDENT IN AN ORGANIZED HEALTH CARE EDUCATION/TRAINING PROGRAM
Payer: COMMERCIAL

## 2024-01-25 LAB
ANION GAP SERPL CALC-SCNC: 7 MMOL/L (ref 0–18)
ANTIBODY SCREEN: NEGATIVE
BASOPHILS # BLD AUTO: 0.04 X10(3) UL (ref 0–0.2)
BASOPHILS NFR BLD AUTO: 0.5 %
BUN BLD-MCNC: 13 MG/DL (ref 9–23)
BUN/CREAT SERPL: 19.7 (ref 10–20)
CALCIUM BLD-MCNC: 8.8 MG/DL (ref 8.7–10.4)
CHLORIDE SERPL-SCNC: 104 MMOL/L (ref 98–112)
CO2 SERPL-SCNC: 27 MMOL/L (ref 21–32)
CREAT BLD-MCNC: 0.66 MG/DL
DEPRECATED RDW RBC AUTO: 48.3 FL (ref 35.1–46.3)
EGFRCR SERPLBLD CKD-EPI 2021: 101 ML/MIN/1.73M2 (ref 60–?)
EOSINOPHIL # BLD AUTO: 0.11 X10(3) UL (ref 0–0.7)
EOSINOPHIL NFR BLD AUTO: 1.5 %
ERYTHROCYTE [DISTWIDTH] IN BLOOD BY AUTOMATED COUNT: 14.7 % (ref 11–15)
GLUCOSE BLD-MCNC: 109 MG/DL (ref 70–99)
HCT VFR BLD AUTO: 27.7 %
HGB BLD-MCNC: 9 G/DL
IMM GRANULOCYTES # BLD AUTO: 0.03 X10(3) UL (ref 0–1)
IMM GRANULOCYTES NFR BLD: 0.4 %
LYMPHOCYTES # BLD AUTO: 1.14 X10(3) UL (ref 1–4)
LYMPHOCYTES NFR BLD AUTO: 15.6 %
MCH RBC QN AUTO: 28.8 PG (ref 26–34)
MCHC RBC AUTO-ENTMCNC: 32.5 G/DL (ref 31–37)
MCV RBC AUTO: 88.8 FL
MONOCYTES # BLD AUTO: 0.99 X10(3) UL (ref 0.1–1)
MONOCYTES NFR BLD AUTO: 13.5 %
NEUTROPHILS # BLD AUTO: 5.02 X10 (3) UL (ref 1.5–7.7)
NEUTROPHILS # BLD AUTO: 5.02 X10(3) UL (ref 1.5–7.7)
NEUTROPHILS NFR BLD AUTO: 68.5 %
OSMOLALITY SERPL CALC.SUM OF ELEC: 287 MOSM/KG (ref 275–295)
PLATELET # BLD AUTO: 260 10(3)UL (ref 150–450)
POTASSIUM SERPL-SCNC: 4 MMOL/L (ref 3.5–5.1)
RBC # BLD AUTO: 3.12 X10(6)UL
RH BLOOD TYPE: POSITIVE
RH BLOOD TYPE: POSITIVE
SODIUM SERPL-SCNC: 138 MMOL/L (ref 136–145)
WBC # BLD AUTO: 7.3 X10(3) UL (ref 4–11)

## 2024-01-25 PROCEDURE — 76000 FLUOROSCOPY <1 HR PHYS/QHP: CPT | Performed by: STUDENT IN AN ORGANIZED HEALTH CARE EDUCATION/TRAINING PROGRAM

## 2024-01-25 PROCEDURE — 86901 BLOOD TYPING SEROLOGIC RH(D): CPT | Performed by: STUDENT IN AN ORGANIZED HEALTH CARE EDUCATION/TRAINING PROGRAM

## 2024-01-25 PROCEDURE — 80048 BASIC METABOLIC PNL TOTAL CA: CPT | Performed by: INTERNAL MEDICINE

## 2024-01-25 PROCEDURE — 86900 BLOOD TYPING SEROLOGIC ABO: CPT | Performed by: STUDENT IN AN ORGANIZED HEALTH CARE EDUCATION/TRAINING PROGRAM

## 2024-01-25 PROCEDURE — 0QS706Z REPOSITION LEFT UPPER FEMUR WITH INTRAMEDULLARY INTERNAL FIXATION DEVICE, OPEN APPROACH: ICD-10-PCS | Performed by: STUDENT IN AN ORGANIZED HEALTH CARE EDUCATION/TRAINING PROGRAM

## 2024-01-25 PROCEDURE — 86850 RBC ANTIBODY SCREEN: CPT | Performed by: STUDENT IN AN ORGANIZED HEALTH CARE EDUCATION/TRAINING PROGRAM

## 2024-01-25 PROCEDURE — 85025 COMPLETE CBC W/AUTO DIFF WBC: CPT | Performed by: INTERNAL MEDICINE

## 2024-01-25 DEVICE — IMPLANTABLE DEVICE: Type: IMPLANTABLE DEVICE | Site: HIP | Status: FUNCTIONAL

## 2024-01-25 RX ORDER — HYDROMORPHONE HYDROCHLORIDE 1 MG/ML
0.2 INJECTION, SOLUTION INTRAMUSCULAR; INTRAVENOUS; SUBCUTANEOUS EVERY 5 MIN PRN
Status: DISCONTINUED | OUTPATIENT
Start: 2024-01-25 | End: 2024-01-25 | Stop reason: HOSPADM

## 2024-01-25 RX ORDER — DEXAMETHASONE SODIUM PHOSPHATE 4 MG/ML
VIAL (ML) INJECTION AS NEEDED
Status: DISCONTINUED | OUTPATIENT
Start: 2024-01-25 | End: 2024-01-25 | Stop reason: SURG

## 2024-01-25 RX ORDER — PANTOPRAZOLE SODIUM 40 MG/1
40 TABLET, DELAYED RELEASE ORAL
Status: DISCONTINUED | OUTPATIENT
Start: 2024-01-26 | End: 2024-01-29

## 2024-01-25 RX ORDER — HYDROMORPHONE HYDROCHLORIDE 1 MG/ML
0.6 INJECTION, SOLUTION INTRAMUSCULAR; INTRAVENOUS; SUBCUTANEOUS EVERY 5 MIN PRN
Status: DISCONTINUED | OUTPATIENT
Start: 2024-01-25 | End: 2024-01-25 | Stop reason: HOSPADM

## 2024-01-25 RX ORDER — METOPROLOL SUCCINATE 25 MG/1
25 TABLET, EXTENDED RELEASE ORAL DAILY
Status: DISCONTINUED | OUTPATIENT
Start: 2024-01-25 | End: 2024-01-25

## 2024-01-25 RX ORDER — SODIUM CHLORIDE, SODIUM LACTATE, POTASSIUM CHLORIDE, CALCIUM CHLORIDE 600; 310; 30; 20 MG/100ML; MG/100ML; MG/100ML; MG/100ML
INJECTION, SOLUTION INTRAVENOUS CONTINUOUS
Status: DISCONTINUED | OUTPATIENT
Start: 2024-01-25 | End: 2024-01-25 | Stop reason: HOSPADM

## 2024-01-25 RX ORDER — MORPHINE SULFATE 10 MG/ML
6 INJECTION, SOLUTION INTRAMUSCULAR; INTRAVENOUS EVERY 10 MIN PRN
Status: DISCONTINUED | OUTPATIENT
Start: 2024-01-25 | End: 2024-01-25 | Stop reason: HOSPADM

## 2024-01-25 RX ORDER — ATORVASTATIN CALCIUM 20 MG/1
20 TABLET, FILM COATED ORAL NIGHTLY
Status: DISCONTINUED | OUTPATIENT
Start: 2024-01-25 | End: 2024-01-29

## 2024-01-25 RX ORDER — NALOXONE HYDROCHLORIDE 0.4 MG/ML
80 INJECTION, SOLUTION INTRAMUSCULAR; INTRAVENOUS; SUBCUTANEOUS AS NEEDED
Status: DISCONTINUED | OUTPATIENT
Start: 2024-01-25 | End: 2024-01-25 | Stop reason: HOSPADM

## 2024-01-25 RX ORDER — TRAMADOL HYDROCHLORIDE 50 MG/1
50 TABLET ORAL EVERY 6 HOURS PRN
Status: DISCONTINUED | OUTPATIENT
Start: 2024-01-25 | End: 2024-01-29

## 2024-01-25 RX ORDER — ONDANSETRON 2 MG/ML
4 INJECTION INTRAMUSCULAR; INTRAVENOUS EVERY 6 HOURS PRN
Status: DISCONTINUED | OUTPATIENT
Start: 2024-01-25 | End: 2024-01-25 | Stop reason: HOSPADM

## 2024-01-25 RX ORDER — SODIUM CHLORIDE, SODIUM LACTATE, POTASSIUM CHLORIDE, CALCIUM CHLORIDE 600; 310; 30; 20 MG/100ML; MG/100ML; MG/100ML; MG/100ML
INJECTION, SOLUTION INTRAVENOUS CONTINUOUS PRN
Status: DISCONTINUED | OUTPATIENT
Start: 2024-01-25 | End: 2024-01-25 | Stop reason: SURG

## 2024-01-25 RX ORDER — CEFAZOLIN SODIUM/WATER 2 G/20 ML
2 SYRINGE (ML) INTRAVENOUS EVERY 8 HOURS
Status: COMPLETED | OUTPATIENT
Start: 2024-01-25 | End: 2024-01-26

## 2024-01-25 RX ORDER — LIDOCAINE HYDROCHLORIDE 10 MG/ML
INJECTION, SOLUTION EPIDURAL; INFILTRATION; INTRACAUDAL; PERINEURAL AS NEEDED
Status: DISCONTINUED | OUTPATIENT
Start: 2024-01-25 | End: 2024-01-25 | Stop reason: SURG

## 2024-01-25 RX ORDER — MORPHINE SULFATE 4 MG/ML
4 INJECTION, SOLUTION INTRAMUSCULAR; INTRAVENOUS EVERY 10 MIN PRN
Status: DISCONTINUED | OUTPATIENT
Start: 2024-01-25 | End: 2024-01-25 | Stop reason: HOSPADM

## 2024-01-25 RX ORDER — TRANEXAMIC ACID 10 MG/ML
INJECTION, SOLUTION INTRAVENOUS AS NEEDED
Status: DISCONTINUED | OUTPATIENT
Start: 2024-01-25 | End: 2024-01-25 | Stop reason: SURG

## 2024-01-25 RX ORDER — MORPHINE SULFATE 4 MG/ML
2 INJECTION, SOLUTION INTRAMUSCULAR; INTRAVENOUS EVERY 10 MIN PRN
Status: DISCONTINUED | OUTPATIENT
Start: 2024-01-25 | End: 2024-01-25 | Stop reason: HOSPADM

## 2024-01-25 RX ORDER — CEFAZOLIN SODIUM/WATER 2 G/20 ML
SYRINGE (ML) INTRAVENOUS AS NEEDED
Status: DISCONTINUED | OUTPATIENT
Start: 2024-01-25 | End: 2024-01-25 | Stop reason: SURG

## 2024-01-25 RX ORDER — PHENYLEPHRINE HCL 10 MG/ML
VIAL (ML) INJECTION AS NEEDED
Status: DISCONTINUED | OUTPATIENT
Start: 2024-01-25 | End: 2024-01-25 | Stop reason: SURG

## 2024-01-25 RX ORDER — METOPROLOL TARTRATE 1 MG/ML
2.5 INJECTION, SOLUTION INTRAVENOUS ONCE
Status: DISCONTINUED | OUTPATIENT
Start: 2024-01-25 | End: 2024-01-25 | Stop reason: HOSPADM

## 2024-01-25 RX ORDER — METHENAMINE HIPPURATE 1000 MG/1
1 TABLET ORAL 2 TIMES DAILY
Status: DISCONTINUED | OUTPATIENT
Start: 2024-01-25 | End: 2024-01-29

## 2024-01-25 RX ORDER — MIDAZOLAM HYDROCHLORIDE 1 MG/ML
INJECTION INTRAMUSCULAR; INTRAVENOUS AS NEEDED
Status: DISCONTINUED | OUTPATIENT
Start: 2024-01-25 | End: 2024-01-25 | Stop reason: SURG

## 2024-01-25 RX ORDER — PROCHLORPERAZINE EDISYLATE 5 MG/ML
5 INJECTION INTRAMUSCULAR; INTRAVENOUS EVERY 8 HOURS PRN
Status: DISCONTINUED | OUTPATIENT
Start: 2024-01-25 | End: 2024-01-25 | Stop reason: HOSPADM

## 2024-01-25 RX ORDER — HYDROMORPHONE HYDROCHLORIDE 1 MG/ML
0.4 INJECTION, SOLUTION INTRAMUSCULAR; INTRAVENOUS; SUBCUTANEOUS EVERY 5 MIN PRN
Status: DISCONTINUED | OUTPATIENT
Start: 2024-01-25 | End: 2024-01-25 | Stop reason: HOSPADM

## 2024-01-25 RX ORDER — HYDROMORPHONE HYDROCHLORIDE 1 MG/ML
INJECTION, SOLUTION INTRAMUSCULAR; INTRAVENOUS; SUBCUTANEOUS AS NEEDED
Status: DISCONTINUED | OUTPATIENT
Start: 2024-01-25 | End: 2024-01-25 | Stop reason: SURG

## 2024-01-25 RX ORDER — OXYCODONE HYDROCHLORIDE 5 MG/1
5 TABLET ORAL EVERY 4 HOURS PRN
Status: DISCONTINUED | OUTPATIENT
Start: 2024-01-25 | End: 2024-01-29

## 2024-01-25 RX ADMIN — PHENYLEPHRINE HCL 100 MCG: 10 MG/ML VIAL (ML) INJECTION at 12:52:00

## 2024-01-25 RX ADMIN — PHENYLEPHRINE HCL 100 MCG: 10 MG/ML VIAL (ML) INJECTION at 11:53:00

## 2024-01-25 RX ADMIN — PHENYLEPHRINE HCL 100 MCG: 10 MG/ML VIAL (ML) INJECTION at 13:01:00

## 2024-01-25 RX ADMIN — LIDOCAINE HYDROCHLORIDE 25 MG: 10 INJECTION, SOLUTION EPIDURAL; INFILTRATION; INTRACAUDAL; PERINEURAL at 11:14:00

## 2024-01-25 RX ADMIN — PHENYLEPHRINE HCL 100 MCG: 10 MG/ML VIAL (ML) INJECTION at 12:41:00

## 2024-01-25 RX ADMIN — SODIUM CHLORIDE, SODIUM LACTATE, POTASSIUM CHLORIDE, CALCIUM CHLORIDE: 600; 310; 30; 20 INJECTION, SOLUTION INTRAVENOUS at 11:09:00

## 2024-01-25 RX ADMIN — PHENYLEPHRINE HCL 100 MCG: 10 MG/ML VIAL (ML) INJECTION at 11:38:00

## 2024-01-25 RX ADMIN — PHENYLEPHRINE HCL 100 MCG: 10 MG/ML VIAL (ML) INJECTION at 11:47:00

## 2024-01-25 RX ADMIN — PHENYLEPHRINE HCL 100 MCG: 10 MG/ML VIAL (ML) INJECTION at 12:20:00

## 2024-01-25 RX ADMIN — PHENYLEPHRINE HCL 100 MCG: 10 MG/ML VIAL (ML) INJECTION at 11:42:00

## 2024-01-25 RX ADMIN — SODIUM CHLORIDE, SODIUM LACTATE, POTASSIUM CHLORIDE, CALCIUM CHLORIDE: 600; 310; 30; 20 INJECTION, SOLUTION INTRAVENOUS at 11:56:00

## 2024-01-25 RX ADMIN — TRANEXAMIC ACID 1000 MG: 10 INJECTION, SOLUTION INTRAVENOUS at 11:30:00

## 2024-01-25 RX ADMIN — PHENYLEPHRINE HCL 100 MCG: 10 MG/ML VIAL (ML) INJECTION at 11:22:00

## 2024-01-25 RX ADMIN — SODIUM CHLORIDE, SODIUM LACTATE, POTASSIUM CHLORIDE, CALCIUM CHLORIDE: 600; 310; 30; 20 INJECTION, SOLUTION INTRAVENOUS at 13:00:00

## 2024-01-25 RX ADMIN — HYDROMORPHONE HYDROCHLORIDE 0.5 MG: 1 INJECTION, SOLUTION INTRAMUSCULAR; INTRAVENOUS; SUBCUTANEOUS at 12:24:00

## 2024-01-25 RX ADMIN — CEFAZOLIN SODIUM/WATER 2 G: 2 G/20 ML SYRINGE (ML) INTRAVENOUS at 11:30:00

## 2024-01-25 RX ADMIN — MIDAZOLAM HYDROCHLORIDE 2 MG: 1 INJECTION INTRAMUSCULAR; INTRAVENOUS at 11:09:00

## 2024-01-25 RX ADMIN — DEXAMETHASONE SODIUM PHOSPHATE 4 MG: 4 MG/ML VIAL (ML) INJECTION at 11:22:00

## 2024-01-25 RX ADMIN — PHENYLEPHRINE HCL 100 MCG: 10 MG/ML VIAL (ML) INJECTION at 11:58:00

## 2024-01-25 NOTE — BRIEF OP NOTE
Pre-Operative Diagnosis:Left greater trochanter fracture with intertrochanteric extension     Post-Operative Diagnosis: Left greater trochanter fracture with intertrochanteric extension     Procedure Performed:   LEFT HIP OPEN REDUCTION INTERNAL FIXATION USING A CEPHALLOMEDULLARY NAIL    Surgeon(s) and Role:     * Narinder Ewing MD - Primary    Assistant(s):  Surgical Assistant.: Kevin Powell     Surgical Findings: Good fixation of left intertrochanteric hip fracture     Specimen: none     Estimated Blood Loss: 15ml    Implants  Synthes 10mm TFNa short nail with 90mm head screw and 32mm shaft interlock      Post Op  WBAT  PT to mobilize  DVT ppx to start POD 1, can restart Eliquis on POD 1  Ancef x 23hrs  Suture removal at 2 weeks      Narinder Ewing MD  1/25/2024  1:12 PM

## 2024-01-25 NOTE — OPERATIVE REPORT
Pre-Operative Diagnosis:Left greater trochanter fracture with intertrochanteric extension     Post-Operative Diagnosis: Left greater trochanter fracture with intertrochanteric extension     Procedure Performed:   LEFT HIP OPEN REDUCTION INTERNAL FIXATION USING A CEPHALLOMEDULLARY NAIL     Surgeon(s) and Role:     * Narinder Ewing MD - Primary     Assistant(s):  Surgical Assistant.: Kevin Powell     Surgical Findings: Good fixation of left intertrochanteric hip fracture     Specimen: none     Estimated Blood Loss: 15ml     Implants  Synthes 10mm TFNa short nail with 90mm head screw and 32mm shaft interlock        Post Op  WBAT  PT to mobilize  DVT ppx to start POD 1, can restart Eliquis on POD 1  Ancef x 23hrs  Suture removal at 2 weeks    Indications  Pre-Operative Diagnosis:Left greater trochanter fracture with intertrochanteric extension     Post-Operative Diagnosis: Left greater trochanter fracture with intertrochanteric extension     Procedure Performed:   LEFT HIP OPEN REDUCTION INTERNAL FIXATION USING A CEPHALLOMEDULLARY NAIL     Surgeon(s) and Role:     * Narinder Ewing MD - Primary     Assistant(s):  Surgical Assistant.: Kevin Powell     Surgical Findings: Good fixation of left intertrochanteric hip fracture     Specimen: none     Estimated Blood Loss: 150ml     Implants  Synthes 10mm TFNa short nail with 90mm head screw and 32mm shaft interlock        Post Op  WBAT  PT to mobilize  DVT ppx to start POD 1, can restart Eliquis on POD 1  Ancef x 23hrs  Suture removal at 2 weeks with Narinder Ewing at [facility] [phone]      Indication  59yo F presented with a left greater trochanter fracture after a fall on her left side. For suspicion of intertrochanteric extension given her pain and inability to ambulate, a MRI was obtained that showed extension into the intertrochanteric region. Therefore, this patient had a left greater trochanter fracture with intertrochanteric extension. I had  a long discussion with the patient and family. The patient has a left greater trochanter fracture with extension into the intertrochanteric region on MRI. Given the degree of extension into the intertrochanteric region, we discussed the option of cephallomedullary nail fixation to prevent displacement and collapse. I discussed with the family the plan for left hip cephallomedullary nail fixation. We discussed the risks including but not limited to infection, bleeding, damage to nearby structures, nonunion, malunion, need for further surgery, leg length discrepancy, rotational deformity, blood clot, deep vein thrombosis, pulmonary embolism, heart attack and death. We also discussed her osteoporosis and the risks of fractures and screw cut out that would necessitate further intervention were this to happen. The patient and family understood. We discussed the mortality and morbidity rates associated with hip fractures and the importance of mobilization to prevent muscle atrophy and the risks associated with prolonged immobility which included pneumonia. The patient and family understood. The alternatives to surgery were discussed including nonoperative care and allowing the fracture to heal in its current alignment. We discussed that there is uncertainty whether the fracture would propagate if left to be healed nonoperatively and if it were to propagate, she would have deformity and would need surgery for fixation as well. The patient and family understood both options and elected to move forward with surgical intervention given the uncertainty with nonoperative management of fractures such as these with intertrochanteric extension on MRI. All questions were answered. No guarantees were given.       Procedure in detail  The patient was greeted in the preoperative holding area. All questions were answered. Surgical consent was obtained. The correct extremity was marked.      The patient was taken to the operating room.  A timeout was performed confirming the patient, surgery and availability of implants. All members of the team agreed. Antibiotics, TXA and general anesthesia was administered by anesthesia. The patient was transferred to the fracture table. Fluoroscopy was used to assist in leg manipulation to allow stable position of the hip for fixation. With the fracture pattern in a reduced position, the leg was then prepped and draped in the usual sterile fashion at this time.     A 5cm posterior lateral incision was made 3cm proximal to the tip of the greater trochanter. Dissection was carried down sharply through the fascia and to the tip of the greater trochanter. A guide wire was placed in the appropriate position on the AP and lateral and malleted into the bone. This was confirmed on fluoroscopy and the guide wire was advanced further. The opening reamer was placed through the tissue protector and advanced through the tip of the trochanter. Visualizing the patients canal, a 10mm short synthes TFNa was opened and assembled on the back table.     The nail was advanced using manual pressure into the correct position to ensure optimal position of the head screw. The fracture site was monitored throughout this entire process on xray. Once the nail was in the appropriate location to allow for a center-center femoral head screw position, an incision was made laterally and dissection was carried through the IT band. The jig was inserted through the guide to the bone. the head guidewire was placed through the jig and confirmed to be in the appropriate position on AP and lateral of the hip. The guide wire was advanced and measured. The cortical punch drill was used to open the lateral cortex and the guidewire was over drilled to accommodate the head screw. A 90mm screw was then placed into the head. After good compression, the blade was locked proximally into a static position.     The the shaft interlock screw was placed through  the jig. The incision laterally was identified in line with the apparatus and sharply the IT band was divided. The drill guide was advanced and the interlock was drilled, measured and the screw placed.     The jig for the nail was removed. Final xrays were taken confirming good reduction, rotation and alignment.     The wounds were thoroughly irrigated and closure was begun. All counts were correct. The IT band was closed with 0 vicryl. Then the skin was closed with 2-0 vicryls and 3-0 nylons. Sterile  dressings were placed.     Then patient was then transferred to her bed and taken to the PACU in good condition.

## 2024-01-25 NOTE — PROGRESS NOTES
[facility]    Progress Note    Denisa Blount Patient Status:  Inpatient    3/4/1965 MRN [id number]   Location [facility] 4W/SW/SE Attending Boyd Landry MD   Hosp Day # 1 PCP YAIR BRO        Subjective:   Denisa Bluont is a(n) 58 year old female now POD 0 s/p L hip CMN for GT fx with extension in to the IT region on MRI. Her pain is controlled and she was resting comfortably in her room. Has not seen PT yet. No paresthesias        Objective:   Vital Signs:  Blood pressure 131/74, pulse 96, temperature 99.8 °F (37.7 °C), temperature source Oral, resp. rate 16, height 5' 5\" (1.651 m), weight 130 lb (59 kg), SpO2 93%.     Physical Exam:   General: Alert, orientated x3.  Cooperative.  No apparent distress.  Left leg  Dressings CDI  Thigh is soft and compressible  SILT s/s/sp/dp/t  EHL/FHL/GS/TA intact  2+ DP pulse  No pain in the knee or ankle          Assessment and Plan:    57yo F with prior R hip fracture and afib on eliquis with a left GT fx with extension into the intertrochanteric region on MRI now s/p L hip CMN (24) for the IT extension    WBAT  PT to mobilize  DVT ppx to start POD 1, can restart Eliquis on POD 1  Ancef x 23hrs  Suture removal at 2 weeks  SW and CM for discharge planning  Follow up in 2 weeks with Dr. Narinder Ewing at [facility] ([phone])                 Results:     Lab Results   Component Value Date    WBC 7.3 2024    HGB 9.0 (L) 2024    HCT 27.7 (L) 2024    .0 2024    CREATSERUM 0.66 2024    BUN 13 2024     2024    K 4.0 2024     2024    CO2 27.0 2024     (H) 2024    CA 8.8 2024    ALB 3.8 2024    ALKPHO 88 2024    BILT 0.2 (L) 2024    TP 7.1 2024    AST 35 (H) 2024    ALT 19 2024    TSH 1.122 2008    MG 1.5 (L) 2024       XR FLUOROSCOPY C-ARM TIME LESS THAN 1 HOUR (CPT=76000)    Result Date:  1/25/2024  CONCLUSION:  Intraoperative fluoroscopy was utilized.    Dictated by (CST): Ronnie Keita MD on 1/25/2024 at 2:59 PM     Finalized by (CST): Ronnie Keita MD on 1/25/2024 at 3:01 PM          MRI HIP LIMITED FX (2) SEQUENCES LEFT (CPT = 03060)    Result Date: 1/25/2024  CONCLUSION:  1. Acute nondisplaced fracture at the left femoral greater trochanter with extension to the intratrochanteric region and posterior femoral neck.  Intratrochanteric extension was occult on prior hip radiographs. 2. Associated edema and/or hematoma within the left gluteal musculature as well as a moderate left hip joint effusion, findings are likely reactive to the aforementioned femoral fracture. 3. Moderate left hip osteoarthritis. 4. Chronic/healed bilateral pubic ramus fractures. 5. Partially imaged susceptibility artifacts from right proximal femoral internal fixation.   A preliminary report was issued by the [facility] teleradiology service. There are no major discrepancies.  elm-remote  Dictated by (CST): Milad Holt MD on 1/25/2024 at 7:21 AM     Finalized by (CST): Milad Holt MD on 1/25/2024 at 7:26 AM          XR CHEST AP PORTABLE  (CPT=71045)    Result Date: 1/24/2024  CONCLUSION:  1. No acute disease in the chest.  Minimal linear left basal scarring/atelectasis.  Old healed bilateral rib fractures.    Dictated by (CST): Apolinar Galloway MD on 1/24/2024 at 1:13 PM     Finalized by (CST): Apolinar Galloway MD on 1/24/2024 at 1:18 PM          CT HIP(BONE) LEFT (CPT=73700)    Result Date: 1/24/2024  CONCLUSION:   Incomplete nondisplaced fracture at the posterior femoral neck.  Comminuted nondisplaced fracture of the greater trochanter.  Mild left hip osteoarthritis.  This report was communicated by telephone to Dr. Mckeon on 1/24/2024 at 1235 hours.        Dictated by (CST): Lorenzo Eastman MD on 1/24/2024 at 12:30 PM     Finalized by (CST): Lorenzo Eastman MD on 1/24/2024 at 12:36 PM          XR RIBS WITH CHEST  (3 VIEWS), LEFT  (CPT=71101)    Result Date: 1/24/2024  CONCLUSION:  1. No acute appearing left rib fractures.  Chronic healed rib fracture deformities on the left as described above.  Additional less well visualized fractures of the posterolateral right 3rd and 4th ribs.  Correlate clinically.    Dictated by (CST): Apolinar Galloway MD on 1/24/2024 at 11:25 AM     Finalized by (CST): Apolinar Galloway MD on 1/24/2024 at 11:30 AM          XR HIP W OR WO PELVIS 2 OR 3 VIEWS, LEFT (CPT=73502)    Result Date: 1/24/2024  CONCLUSION:  1. There is a poorly visualized incomplete appearing vertical fracture through the lateral aspect of the left greater trochanter of the left hip extending inferiorly to the subtrochanteric region of the proximal left femoral shaft.  This is incomplete and nondisplaced.  Recommend CT scanning to further assess its true extent. 2. Chronic fracture deformity of the superior pubic ramus on the right.  3. Lesser findings as described above.    Dictated by (CST): Apolinar Galloway MD on 1/24/2024 at 11:19 AM     Finalized by (CST): Apolinar Galloway MD on 1/24/2024 at 11:24 AM          CT BRAIN OR HEAD (14807)    Result Date: 1/24/2024  CONCLUSION:  1. No acute intracranial process by noncontrast CT technique. 2. Mild scattered intracranial atherosclerosis. 3. Left sphenoid sinus mucosal thickening. 4. Lesser incidental findings as above.   Dictated by (CST): Milad Holt MD on 1/24/2024 at 11:20 AM     Finalized by (CST): Milad Holt MD on 1/24/2024 at 11:22 AM         EKG 12 Lead    Result Date: 1/24/2024  Normal sinus rhythm Normal ECG No previous ECGs found in [facility] Confirmed by ISH MATHIAS, RENATA ([id number]) on 1/24/2024 2:54:52 PM              Narinder Ewing MD  1/25/2024

## 2024-01-25 NOTE — CONSULTS
[facility]    Report of Consultation    Denisa Blount Patient Status:  Inpatient    3/4/1965 MRN [id number]   Location [facility] PRE OP RECOVERY Attending Boyd Landry MD   Hosp Day # 1 PCP YAIR BRO     Date of Admission:  2024  Date of Consult:  24  Reason for Consultation:   Left greater trochanter fracture with intertrochanteric extension    History of Present Illness:   Patient is a 58 year old female who was admitted to the hospital for Closed fracture of neck of left femur, initial encounter ([facility]):  57yo F with seizure disorder, afib on eliquis, R hip fracture treated 30 years ago with a left greater trochanter fracture with extension into the intertrochanteric region on MRI. She is a community ambulator and otherwise healthy. She fell at 1am on 24 going to the bathroom. She was having severe left hip pain and presented to the emergency room. A xray and CT showed a greater trochanter fracture on the left hip and an MRI was obtained to rule out any intertrochanteric extension. On the MRI, there was intertrochanteric extension. She is otherwise healthy and neurovascularly intact. She is a former smoker and nondiabetic to her knowledge.    Past Medical History  Past Medical History:   Diagnosis Date    Irritable bowel syndrome     OTHER DISEASES     pain in knee    Seizure disorder ([facility])        Past Surgical History  Past Surgical History:   Procedure Laterality Date    COLONOSCOPY,BIOPSY  3/25/09    Performed by HAYLEY HOBBS at [facility]    KNEE SCOPE,MED&LAT MENIS SHAV  3/21/2014    Procedure: ARTHROSCOPY LEFT KNEE W/ MEDIAL MENISCECTOMY;  Surgeon: Russell Thomas MD;  Location: [facility]    OTHER SURGICAL HISTORY      orif right ankle    OTHER SURGICAL HISTORY      orif hip right    OTHER SURGICAL HISTORY  3*/21/14    left knee a pmm/plm       Family History  History reviewed. No pertinent family history.    Social  History  Social History     Socioeconomic History    Marital status:    Tobacco Use    Smoking status: Former     Years: 10     Types: Cigarettes     Quit date: 2003     Years since quittin.0    Smokeless tobacco: Never   Vaping Use    Vaping Use: Never used   Substance and Sexual Activity    Alcohol use: Yes     Comment: 2x week    Drug use: No     Social Determinants of Health     Food Insecurity: No Food Insecurity (2024)    Food Insecurity     Food Insecurity: Never true   Transportation Needs: No Transportation Needs (2024)    Transportation Needs     Lack of Transportation: No   Housing Stability: Low Risk  (2024)    Housing Stability     Housing Instability: No           Current Medications:  Current Facility-Administered Medications   Medication Dose Route Frequency    [MAR Hold] levETIRAcetam (Keppra) tab 1,000 mg  1,000 mg Oral BID    [MAR Hold] morphINE PF 2 MG/ML injection 1 mg  1 mg Intravenous Q2H PRN     Medications Prior to Admission   Medication Sig    levETIRAcetam 100 MG/ML Oral Solution Take 10 mg/kg by mouth 2 (two) times daily.    metoprolol succinate ER 25 MG Oral Tablet 24 Hr Take 1 tablet (25 mg total) by mouth daily.    methenamine 1 g Oral Tab Take 1 tablet (1 g total) by mouth 2 (two) times daily.    omeprazole 20 MG Oral Capsule Delayed Release Take 2 capsules (40 mg total) by mouth every morning before breakfast.    atorvastatin 20 MG Oral Tab Take 1 tablet (20 mg total) by mouth nightly.    apixaban 5 MG Oral Tab Take 1 tablet (5 mg total) by mouth 2 (two) times daily.    rivaroxaban 10 MG Oral Tab Take 0.5 tablets (5 mg total) by mouth. (Patient not taking: Reported on 2024)       Allergies  No Known Allergies    Review of Systems:   Pertinent items are noted in HPI.    Physical Exam:   Vital Signs:  Blood pressure 128/69, pulse 104, temperature (!) 101.5 °F (38.6 °C), temperature source Oral, resp. rate 16, height 5' 5\" (1.651 m), weight 130 lb (59  kg), SpO2 94%.     General: No acute distress. Alert and oriented x 3.  HEENT: Moist mucous membranes. EOM-I. PERRL  Neck: No lymphadenopathy.  No JVD. No carotid bruits.  Respiratory: Nonlabored    Musculoskeletal:   Left leg exam  TTP with log roll  TTP in the proximal thigh, thigh is soft and compressible  Thigh soft and compressible  No ecchymosis  SILT s/s/sp/dp/t  EHL/FHL/GS/TA intact  2+ DP pulse  No pain in the tibia, tibia compartments soft and compressible  No pain in the knee    Right leg  Lateral scar  No pain with log roll  FROM of the hip, knee, ankle  SILT s/s/sp/dp/t  EHL/FHL/GS/TA intact  2+ DP pulse    Results:     Laboratory Data:  Lab Results   Component Value Date    WBC 7.3 01/25/2024    HGB 9.0 (L) 01/25/2024    HCT 27.7 (L) 01/25/2024    .0 01/25/2024    CREATSERUM 0.66 01/25/2024    BUN 13 01/25/2024     01/25/2024    K 4.0 01/25/2024     01/25/2024    CO2 27.0 01/25/2024     (H) 01/25/2024    CA 8.8 01/25/2024    ALB 3.8 01/24/2024    ALKPHO 88 01/24/2024    TP 7.1 01/24/2024    AST 35 (H) 01/24/2024    ALT 19 01/24/2024    TSH 1.122 05/05/2008    MG 1.5 (L) 01/24/2024         Imaging:  Xray hip shows a Left greater trochanter fracture with propagation distally about the lateral cortex  CT left hip shows a left greater trochanter fracture with propagation towards the intertrochanteric region  MRI left hip shows a left greater trochanter fracture with edema and propagation into the intertrochanteric region concerning for intertrochanteric extension      MRI HIP LIMITED FX (2) SEQUENCES LEFT (CPT = 73670)    Result Date: 1/25/2024  CONCLUSION:  1. Acute nondisplaced fracture at the left femoral greater trochanter with extension to the intratrochanteric region and posterior femoral neck.  Intratrochanteric extension was occult on prior hip radiographs. 2. Associated edema and/or hematoma within the left gluteal musculature as well as a moderate left hip joint  effusion, findings are likely reactive to the aforementioned femoral fracture. 3. Moderate left hip osteoarthritis. 4. Chronic/healed bilateral pubic ramus fractures. 5. Partially imaged susceptibility artifacts from right proximal femoral internal fixation.   A preliminary report was issued by the [facility] teleradiology service. There are no major discrepancies.  elm-remote  Dictated by (CST): Milad Holt MD on 1/25/2024 at 7:21 AM     Finalized by (CST): Milad Holt MD on 1/25/2024 at 7:26 AM          XR CHEST AP PORTABLE  (CPT=71045)    Result Date: 1/24/2024  CONCLUSION:  1. No acute disease in the chest.  Minimal linear left basal scarring/atelectasis.  Old healed bilateral rib fractures.    Dictated by (CST): Apolinar Galloway MD on 1/24/2024 at 1:13 PM     Finalized by (CST): Apolinar Galloway MD on 1/24/2024 at 1:18 PM          CT HIP(BONE) LEFT (CPT=73700)    Result Date: 1/24/2024  CONCLUSION:   Incomplete nondisplaced fracture at the posterior femoral neck.  Comminuted nondisplaced fracture of the greater trochanter.  Mild left hip osteoarthritis.  This report was communicated by telephone to Dr. Mckeon on 1/24/2024 at 1235 hours.        Dictated by (CST): Lorenzo Eastman MD on 1/24/2024 at 12:30 PM     Finalized by (CST): Lorenzo Eastman MD on 1/24/2024 at 12:36 PM          XR RIBS WITH CHEST (3 VIEWS), LEFT  (CPT=71101)    Result Date: 1/24/2024  CONCLUSION:  1. No acute appearing left rib fractures.  Chronic healed rib fracture deformities on the left as described above.  Additional less well visualized fractures of the posterolateral right 3rd and 4th ribs.  Correlate clinically.    Dictated by (CST): Apolinar Galloway MD on 1/24/2024 at 11:25 AM     Finalized by (CST): Apolinar Galloway MD on 1/24/2024 at 11:30 AM          XR HIP W OR WO PELVIS 2 OR 3 VIEWS, LEFT (CPT=73502)    Result Date: 1/24/2024  CONCLUSION:  1. There is a poorly visualized incomplete appearing vertical fracture through the  lateral aspect of the left greater trochanter of the left hip extending inferiorly to the subtrochanteric region of the proximal left femoral shaft.  This is incomplete and nondisplaced.  Recommend CT scanning to further assess its true extent. 2. Chronic fracture deformity of the superior pubic ramus on the right.  3. Lesser findings as described above.    Dictated by (CST): Apolinar Galloway MD on 1/24/2024 at 11:19 AM     Finalized by (CST): Apolinar Galloway MD on 1/24/2024 at 11:24 AM          CT BRAIN OR HEAD (50140)    Result Date: 1/24/2024  CONCLUSION:  1. No acute intracranial process by noncontrast CT technique. 2. Mild scattered intracranial atherosclerosis. 3. Left sphenoid sinus mucosal thickening. 4. Lesser incidental findings as above.   Dictated by (CST): Milad Holt MD on 1/24/2024 at 11:20 AM     Finalized by (CST): Milad Holt MD on 1/24/2024 at 11:22 AM              Impression:       59yo F with prior R hip fracture and afib on eliquis presents with a left GT fx with extension into the intertrochanteric region on MRI. She is otherwise healthy and an ambulator    Plan for Left hip open reduction internal fixation with cephallomedullary nail today  -Keep NPO  -NWB LLE pending surgery  -Admit to primary medical team     JULIAN  I had a long discussion with the patient and family. The patient has a left greater trochanter fracture with extension into the intertrochanteric region on MRI. Given the degree of extension into the intertrochanteric region, we discussed the option of cephallomedullary nail fixation to prevent displacement and collapse. I discussed with the family the plan for left hip cephallomedullary nail fixation. We discussed the risks including but are not limited to infection, bleeding, damage to nearby structures, nonunion, malunion, need for further surgery, leg length discrepancy, rotational deformity, blood clot, deep vein thrombosis, pulmonary embolism, heart attack and death.  We also discussed her osteoporosis and the risks of fractures and screw cut out that would necessitate further intervention were this to happen. The patient and family understood. We discussed the mortality and morbidity rates associated with hip fractures and the importance of mobilization to prevent muscle atrophy and the risks associated with prolonged immobility which included pneumonia. The patient and family understood. The alternatives to surgery were discussed including nonoperative care and allowing the fracture to heal in its current alignment. We discussed that there is uncertainty whether the fracture would propagate if left to be healed nonoperatively and if it were to propagate, she would have deformity and would need surgery for fixation as well. The patient and family understood both options and elected to move forward with surgical intervention given the uncertainty with nonoperative management of fractures such as these with intertrochanteric extension on MRI. All questions were answered. No guarantees were given.        Narinder Ewing MD  1/25/2024

## 2024-01-25 NOTE — PLAN OF CARE
Problem: Patient Centered Care  Goal: Patient preferences are identified and integrated in the patient's plan of care  Description: Interventions:  - What would you like us to know as we care for you?   - Provide timely, complete, and accurate information to patient/family  - Incorporate patient and family knowledge, values, beliefs, and cultural backgrounds into the planning and delivery of care  - Encourage patient/family to participate in care and decision-making at the level they choose  - Honor patient and family perspectives and choices  Outcome: Progressing

## 2024-01-25 NOTE — ANESTHESIA POSTPROCEDURE EVALUATION
Patient: Denisa Blount    Procedure Summary       Date: 01/25/24 Room / Location: [facility] MAIN OR 04 / [facility] MAIN OR    Anesthesia Start: 1109 Anesthesia Stop: 1329    Procedure: LEFT HIP OPEN REDUCTION INTERNAL FIXATION USING A CEPHALLOMEDULLARY NAIL (Left: Hip) Diagnosis: (Closed fracture of neck of left femur,)    Surgeons: Narinder Ewing MD Anesthesiologist: Lisandro Starr MD    Anesthesia Type: general ASA Status: 2            Anesthesia Type: general    Vitals Value Taken Time   /52 01/25/24 1329   Temp 99.4 °F (37.4 °C) 01/25/24 1327   Pulse 101 01/25/24 1329   Resp 15 01/25/24 1329   SpO2 99 % 01/25/24 1329   Vitals shown include unfiled device data.    [facility] AN Post Evaluation:   Patient Evaluated in PACU  Patient Participation: complete - patient participated  Level of Consciousness: awake and alert  Pain Management: adequate  Airway Patency:patent  Dental exam unchanged from preop  Yes    Cardiovascular Status: acceptable  Respiratory Status: acceptable  Postoperative Hydration acceptable  Comments: Patient able to move all extremities to command.      KENDY ALMAZAN CRNA  1/25/2024 1:29 PM

## 2024-01-25 NOTE — PLAN OF CARE
Patient AOX4. Room air. Cardiac diet, tolerated well. NPO midnight. Purewick in plae. PRN morphine given for pain management. Safety precautions in place. Call light within reach. Plan for surgery tomorrow.     Problem: Patient Centered Care  Goal: Patient preferences are identified and integrated in the patient's plan of care  Description: Interventions:  - What would you like us to know as we care for you?   - Provide timely, complete, and accurate information to patient/family  - Incorporate patient and family knowledge, values, beliefs, and cultural backgrounds into the planning and delivery of care  - Encourage patient/family to participate in care and decision-making at the level they choose  - Honor patient and family perspectives and choices  Outcome: Progressing     Problem: Patient/Family Goals  Goal: Patient/Family Long Term Goal  Description: Patient's Long Term Goal:     Interventions:  -   - See additional Care Plan goals for specific interventions  Outcome: Progressing  Goal: Patient/Family Short Term Goal  Description: Patient's Short Term Goal:     Interventions:   -  - See additional Care Plan goals for specific interventions  Outcome: Progressing

## 2024-01-25 NOTE — ANESTHESIA PREPROCEDURE EVALUATION
Anesthesia PreOp Note    HPI:     Denisa Blount is a 58 year old female who presents for preoperative consultation requested by: Narinder Ewing MD    Date of Surgery: 1/24/2024 - 1/25/2024    Procedure(s):  LEFT HIP OPEN REDUCTION INTERNAL FIXATION  Indication: Closed fracture of neck of left femur,    Relevant Problems   No relevant active problems       NPO:  Last Liquid Consumption Date: 01/25/24  Last Liquid Consumption Time: 0816 (small sip with meds)  Last Solid Consumption Date: 01/24/24  Last Solid Consumption Time: 1845  Last Liquid Consumption Date: 01/25/24          History Review:  Patient Active Problem List    Diagnosis Date Noted    Closed fracture of neck of left femur, initial encounter ([facility]) 01/24/2024    L knee global 6/14/14 03/31/2014    Degenerative tear of lateral meniscus of left knee 03/27/2014    Malunion, fracture 03/03/2011    Primary localized osteoarthrosis, lower leg 03/03/2011    Derangement of posterior horn of medial meniscus 03/03/2011    Chondromalacia of patella 03/03/2011    Ankle fracture 03/03/2011    Hip fracture ([facility]) 03/03/2011       Past Medical History:   Diagnosis Date    Irritable bowel syndrome     OTHER DISEASES     pain in knee    Seizure disorder ([facility])        Past Surgical History:   Procedure Laterality Date    COLONOSCOPY,BIOPSY  3/25/09    Performed by HAYLEY HOBBS at [facility]    KNEE SCOPE,MED&LAT MENIS SHAV  3/21/2014    Procedure: ARTHROSCOPY LEFT KNEE W/ MEDIAL MENISCECTOMY;  Surgeon: Russell Thomas MD;  Location: [facility]    OTHER SURGICAL HISTORY  1999    orif right ankle    OTHER SURGICAL HISTORY  1999    orif hip right    OTHER SURGICAL HISTORY  3*/21/14    left knee a pmm/plm       Medications Prior to Admission   Medication Sig Dispense Refill Last Dose    levETIRAcetam 100 MG/ML Oral Solution Take 10 mg/kg by mouth 2 (two) times daily.   1/23/2024    metoprolol succinate ER 25 MG Oral Tablet 24 Hr Take 1 tablet (25 mg  total) by mouth daily.   1/23/2024    methenamine 1 g Oral Tab Take 1 tablet (1 g total) by mouth 2 (two) times daily.   1/23/2024    omeprazole 20 MG Oral Capsule Delayed Release Take 2 capsules (40 mg total) by mouth every morning before breakfast.   1/23/2024    atorvastatin 20 MG Oral Tab Take 1 tablet (20 mg total) by mouth nightly.   1/23/2024    apixaban 5 MG Oral Tab Take 1 tablet (5 mg total) by mouth 2 (two) times daily.   1/23/2024    rivaroxaban 10 MG Oral Tab Take 0.5 tablets (5 mg total) by mouth. (Patient not taking: Reported on 1/24/2024)   Not Taking     Current Facility-Administered Medications Ordered in Epic   Medication Dose Route Frequency Provider Last Rate Last Admin    phenylephrine (Jeremi-Synephrine) 10 MG/ML injection   Intravenous PRN Chante Vaughn CRNA   100 mcg at 01/25/24 1147    midazolam (Versed) 2 MG/2ML injection   Intravenous PRN Chante Vaughn CRNA   2 mg at 01/25/24 1109    fentaNYL (Sublimaze) 50 mcg/mL injection   Intravenous PRN Chante Vaughn CRNA   50 mcg at 01/25/24 1125    lidocaine PF (Xylocaine-MPF) 1% injection   Intravenous PRN Chante Vaughn CRNA   25 mg at 01/25/24 1114    propofol (Diprivan) 10 MG/ML injection   Intravenous PRN Chante Vaughn CRNA   150 mg at 01/25/24 1114    dexamethasone (Decadron) 4 MG/ML injection   Intravenous PRN Chante Vaughn CRNA   4 mg at 01/25/24 1122    lactated ringers infusion   Intravenous Continuous PRN Chante Vaughn CRNA   New Bag at 01/25/24 1109    ceFAZolin (Ancef) 2 g in 20mL IV syringe premix   Intravenous PRN Chante Vaughn CRNA   2 g at 01/25/24 1130    tranexamic acid in sodium chloride 0.7% (Cyklokapron) 1000 mg/100mL infusion premix   Intravenous PRN Chante Vaughn CRNA   1,000 mg at 01/25/24 1130    [MAR Hold] levETIRAcetam (Keppra) tab 1,000 mg  1,000 mg Oral BID Boyd Ladnry MD   1,000 mg at 01/25/24 0816    [MAR Hold] morphINE PF 2 MG/ML injection 1 mg  1 mg  Intravenous Q2H Boyd Terry MD   1 mg at 24 0959     No current Epic-ordered outpatient medications on file.       No Known Allergies    History reviewed. No pertinent family history.  Social History     Socioeconomic History    Marital status:    Tobacco Use    Smoking status: Former     Years: 10     Types: Cigarettes     Quit date: 2003     Years since quittin.0    Smokeless tobacco: Never   Vaping Use    Vaping Use: Never used   Substance and Sexual Activity    Alcohol use: Yes     Comment: 2x week    Drug use: No       Available pre-op labs reviewed.  Lab Results   Component Value Date    WBC 7.3 2024    RBC 3.12 (L) 2024    HGB 9.0 (L) 2024    HCT 27.7 (L) 2024    MCV 88.8 2024    MCH 28.8 2024    MCHC 32.5 2024    RDW 14.7 2024    .0 2024     Lab Results   Component Value Date     2024    K 4.0 2024     2024    CO2 27.0 2024    BUN 13 2024    CREATSERUM 0.66 2024     (H) 2024    PGLU 99 2024    CA 8.8 2024          Vital Signs:  Body mass index is 21.63 kg/m².   height is 1.651 m (5' 5\") and weight is 59 kg (130 lb). Her oral temperature is 101.5 °F (38.6 °C) (abnormal). Her blood pressure is 128/69 and her pulse is 104. Her respiration is 16 and oxygen saturation is 94%.   Vitals:    24 2023 24 0444 24 0814 24 1041   BP: 110/77 119/69 120/76 128/69   Pulse: 103 97 99 104   Resp: 17 18 16 16   Temp: 98.3 °F (36.8 °C) 98 °F (36.7 °C) 98.9 °F (37.2 °C) (!) 101.5 °F (38.6 °C)   TempSrc: Oral Oral Oral Oral   SpO2: 97% 95% 93% 94%   Weight:       Height:            Anesthesia Evaluation     Patient summary reviewed and Nursing notes reviewed    Airway   Mallampati: II  TM distance: >3 FB  Neck ROM: full  Dental      Pulmonary - negative ROS and normal exam    breath sounds clear to auscultation  Cardiovascular - negative ROS  and normal exam    Rhythm: regular  Rate: normal    Neuro/Psych - negative ROS     GI/Hepatic/Renal - negative ROS     Endo/Other - negative ROS   Abdominal                  Anesthesia Plan:   ASA:  2  Plan:   General  Airway:  LMA and ETT  Post-op Pain Management: IV analgesics  Informed Consent Plan and Risks Discussed With:  Patient  Discussed plan with:  CRNA      I have informed Denisa Blount and/or legal guardian or family member of the nature of the anesthetic plan, benefits, risks including possible dental damage if relevant, major complications, and any alternative forms of anesthetic management.   All of the patient's questions were answered to the best of my ability. The patient desires the anesthetic management as planned.  WINNIE JARA MD  1/25/2024 11:51 AM  Present on Admission:  **None**

## 2024-01-25 NOTE — CM/SW NOTE
01/25/24 1500   CM/SW Referral Data   Referral Source    Reason for Referral Discharge planning   Informant Patient;Son   Medical Hx   Does patient have an established PCP? Yes  ([facility])   Patient Info   Patient's Current Mental Status at Time of Assessment Alert;Oriented   Patient's Home Environment House   Patient lives with Spouse/Significant other;Son   Patient Status Prior to Admission   Independent with ADLs and Mobility Yes   Discharge Needs   Anticipated D/C needs To be determined     Pt discussed during nursing rounds. Dx left femur fx, surgery today. Home w/spouse and son, independent w/walker at baseline. PT/OT evals needed for dc recommendation, evals pending.    Plan: TBD    / to remain available for support and/or discharge planning.     MARILYN Reyes    [phone]

## 2024-01-25 NOTE — ANESTHESIA PROCEDURE NOTES
Airway  Date/Time: 1/25/2024 11:16 AM  Urgency: Elective    Airway not difficult    General Information and Staff    Patient location during procedure: OR  Anesthesiologist: Lisandro Starr MD  Resident/CRNA: Chante Vaughn CRNA  Performed: CRNA   Performed by: Chante Vaughn CRNA  Authorized by: Chante Vaughn CRNA      Indications and Patient Condition  Indications for airway management: anesthesia  Sedation level: deep  Preoxygenated: yes  Patient position: sniffing  Mask difficulty assessment: 1 - vent by mask    Final Airway Details  Final airway type: supraglottic airway      Successful airway: classic  Size 4       Number of attempts at approach: 1  Number of other approaches attempted: 0

## 2024-01-25 NOTE — H&P
[facility]    History & Physical    Denisa Blount Patient Status:  Inpatient    3/4/1965 MRN [id number]   Location [facility] 4W/SW/SE Attending Boyd Landry MD   Hosp Day # 1 PCP YAIR BRO     Date:  2024  Date of Admission:  2024    History provided by:patient  Chief Complaint:     Chief Complaint   Patient presents with    Syncope    Hip Pain       HPI:   Denisa Blount is a(n) 58 year old female.   With the seizure disorder well controlled, atrial fibrillation, history of the right leg the surgery in the past who was in her usual state of health.  Patient woke up at night around 1:00 a.m. and she was going to the bathroom and she fell down.  Patient the denies any dizziness or any chest pain or any palpitations prior to the fall.  Patient was complaining of severe pain in the left hip.  And the paramedics were called and she was brought to the emergency room.  A family member heard the fall.  Patient also feels that she might have lost consciousness.    There is no active seizure activity reported.    Patient currently denies any chest pain or any shortness of breath.    History     Past Medical History:   Diagnosis Date    Irritable bowel syndrome     OTHER DISEASES     pain in knee    Seizure disorder (HCC)      Past Surgical History:   Procedure Laterality Date    COLONOSCOPY,BIOPSY  3/25/09    Performed by HAYLEY HOBBS at [facility]    KNEE SCOPE,MED&LAT MENIS SHAV  3/21/2014    Procedure: ARTHROSCOPY LEFT KNEE W/ MEDIAL MENISCECTOMY;  Surgeon: Russell Thomas MD;  Location: [facility]    OTHER SURGICAL HISTORY      orif right ankle    OTHER SURGICAL HISTORY      orif hip right    OTHER SURGICAL HISTORY  3*/21/14    left knee a pmm/plm     History reviewed. No pertinent family history.  Social History:  Social History     Socioeconomic History    Marital status:    Tobacco Use    Smoking status: Former     Years: 10      Types: Cigarettes     Quit date: 2003     Years since quittin.0    Smokeless tobacco: Never   Vaping Use    Vaping Use: Never used   Substance and Sexual Activity    Alcohol use: Yes     Comment: 2x week    Drug use: No     Social Determinants of Health     Food Insecurity: No Food Insecurity (2024)    Food Insecurity     Food Insecurity: Never true   Transportation Needs: No Transportation Needs (2024)    Transportation Needs     Lack of Transportation: No   Housing Stability: Low Risk  (2024)    Housing Stability     Housing Instability: No     Allergies/Medications:   Allergies: No Known Allergies  Medications Prior to Admission   Medication Sig    levETIRAcetam 100 MG/ML Oral Solution Take 10 mg/kg by mouth 2 (two) times daily.    metoprolol succinate ER 25 MG Oral Tablet 24 Hr Take 1 tablet (25 mg total) by mouth daily.    methenamine 1 g Oral Tab Take 1 tablet (1 g total) by mouth 2 (two) times daily.    omeprazole 20 MG Oral Capsule Delayed Release Take 2 capsules (40 mg total) by mouth every morning before breakfast.    atorvastatin 20 MG Oral Tab Take 1 tablet (20 mg total) by mouth nightly.    apixaban 5 MG Oral Tab Take 1 tablet (5 mg total) by mouth 2 (two) times daily.    rivaroxaban 10 MG Oral Tab Take 0.5 tablets (5 mg total) by mouth. (Patient not taking: Reported on 2024)       Review of Systems:   A comprehensive review of systems was negative.    No headache no fever no chills no shortness of breath no nausea no vomiting no other complaints  Physical Exam:   Vital Signs:  Blood pressure 120/76, pulse 99, temperature 98.9 °F (37.2 °C), temperature source Oral, resp. rate 16, height 5' 5\" (1.651 m), weight 130 lb (59 kg), SpO2 93%.    The patient seen in the emergency room.  She is in distress secondary to pain.   is at the bedside.    HEENT mild pallor noted.    No icterus.    Neck no JVD no carotid bruit.    Heart S1 and S2 irregular in rate and rhythm.    The  lungs are clear to auscultation.    Abdomen is soft bowel sounds are present  Extremities no pedal edema  CNS examination patient is conscious and alert  The left the leg is externally rotated.       Cervical Papanicolaou to be done in MD's office    Results:     Lab Results   Component Value Date    WBC 7.3 01/25/2024    HGB 9.0 (L) 01/25/2024    HCT 27.7 (L) 01/25/2024    .0 01/25/2024    CREATSERUM 0.66 01/25/2024    BUN 13 01/25/2024     01/25/2024    K 4.0 01/25/2024     01/25/2024    CO2 27.0 01/25/2024     (H) 01/25/2024    CA 8.8 01/25/2024    ALB 3.8 01/24/2024    ALKPHO 88 01/24/2024    BILT 0.2 (L) 01/24/2024    TP 7.1 01/24/2024    AST 35 (H) 01/24/2024    ALT 19 01/24/2024    TSH 1.122 05/05/2008    MG 1.5 (L) 01/24/2024       MRI HIP LIMITED FX (2) SEQUENCES LEFT (CPT = 32905)    Result Date: 1/25/2024  CONCLUSION:  1. Acute nondisplaced fracture at the left femoral greater trochanter with extension to the intratrochanteric region and posterior femoral neck.  Intratrochanteric extension was occult on prior hip radiographs. 2. Associated edema and/or hematoma within the left gluteal musculature as well as a moderate left hip joint effusion, findings are likely reactive to the aforementioned femoral fracture. 3. Moderate left hip osteoarthritis. 4. Chronic/healed bilateral pubic ramus fractures. 5. Partially imaged susceptibility artifacts from right proximal femoral internal fixation.   A preliminary report was issued by the [facility] teleradiology service. There are no major discrepancies.  elm-remote  Dictated by (CST): Milad Holt MD on 1/25/2024 at 7:21 AM     Finalized by (CST): Milad Holt MD on 1/25/2024 at 7:26 AM          XR CHEST AP PORTABLE  (CPT=71045)    Result Date: 1/24/2024  CONCLUSION:  1. No acute disease in the chest.  Minimal linear left basal scarring/atelectasis.  Old healed bilateral rib fractures.    Dictated by (CST): Apolinar Galloway MD on  1/24/2024 at 1:13 PM     Finalized by (CST): Apolinar Galloway MD on 1/24/2024 at 1:18 PM          CT HIP(BONE) LEFT (CPT=73700)    Result Date: 1/24/2024  CONCLUSION:   Incomplete nondisplaced fracture at the posterior femoral neck.  Comminuted nondisplaced fracture of the greater trochanter.  Mild left hip osteoarthritis.  This report was communicated by telephone to Dr. Mckeon on 1/24/2024 at 1235 hours.        Dictated by (CST): Lorenzo Eastman MD on 1/24/2024 at 12:30 PM     Finalized by (CST): Lorenzo Eastman MD on 1/24/2024 at 12:36 PM          XR RIBS WITH CHEST (3 VIEWS), LEFT  (CPT=71101)    Result Date: 1/24/2024  CONCLUSION:  1. No acute appearing left rib fractures.  Chronic healed rib fracture deformities on the left as described above.  Additional less well visualized fractures of the posterolateral right 3rd and 4th ribs.  Correlate clinically.    Dictated by (CST): Apolinar Galloway MD on 1/24/2024 at 11:25 AM     Finalized by (CST): Apolinar Galloway MD on 1/24/2024 at 11:30 AM          XR HIP W OR WO PELVIS 2 OR 3 VIEWS, LEFT (CPT=73502)    Result Date: 1/24/2024  CONCLUSION:  1. There is a poorly visualized incomplete appearing vertical fracture through the lateral aspect of the left greater trochanter of the left hip extending inferiorly to the subtrochanteric region of the proximal left femoral shaft.  This is incomplete and nondisplaced.  Recommend CT scanning to further assess its true extent. 2. Chronic fracture deformity of the superior pubic ramus on the right.  3. Lesser findings as described above.    Dictated by (CST): Apolinar Galloway MD on 1/24/2024 at 11:19 AM     Finalized by (CST): Apolinar Galloway MD on 1/24/2024 at 11:24 AM          CT BRAIN OR HEAD (80843)    Result Date: 1/24/2024  CONCLUSION:  1. No acute intracranial process by noncontrast CT technique. 2. Mild scattered intracranial atherosclerosis. 3. Left sphenoid sinus mucosal thickening. 4. Lesser incidental findings as above.   Dictated  by (CST): Milad Holt MD on 1/24/2024 at 11:20 AM     Finalized by (CST): Milad Holt MD on 1/24/2024 at 11:22 AM         EKG 12 Lead    Result Date: 1/24/2024  Normal sinus rhythm Normal ECG No previous ECGs found in [facility] Confirmed by ISH MATHIAS ELMER ([id number]) on 1/24/2024 2:54:52 PM     Assessment/Plan:      Active Orders   Nourishments    Room Service Eligibility Until Discontinued     Frequency: Until Discontinued     Number of Occurrences: Until Specified    Room Service Notify RN Until Discontinued     Frequency: Until Discontinued     Number of Occurrences: Until Specified   Microbiology    Clostridium difficile(toxigenic)PCR     Frequency: Once     Number of Occurrences: 1 Occurrences   Diet    NPO     Frequency: Effective Midnight     Number of Occurrences: Until Specified   Nursing    Initiate electrolyte protocol     Frequency: Until Discontinued     Number of Occurrences: Until Specified    Verify informed consent     Frequency: Once     Number of Occurrences: 1 Occurrences    Vital signs     Frequency: Per Unit Routine     Number of Occurrences: 2 Hours     Order Comments: ED holding order only  Cancel if other admission orders exist!       Consult    ED Consult to Orthopedic Surgery     Frequency: Once     Number of Occurrences: 1 Occurrences    ED Consult to Orthopedic Surgery     Frequency: Once     Number of Occurrences: 1 Occurrences    ED Consult to Primary Care/Medicine     Frequency: Once     Number of Occurrences: 1 Occurrences   Isolation    [employer]/Contact PLUS Isolation Continuous     Frequency: Continuous     Number of Occurrences: Until Specified   Medications    levETIRAcetam (Keppra) tab 1,000 mg     Frequency: BID     Dose: 1,000 mg     Route: Oral    morphINE PF 2 MG/ML injection 1 mg     Frequency: Q2H PRN     Dose: 1 mg     Route: Intravenous           Closed fracture of neck of left femur, initial encounter ([facility])    Orthopedic has been consulted.    Pain controlled with  morphine.      Seizure disorder.    Continue Keppra.      Atrial fibrillation is stable.    Hold Eliquis the until patient is seen by Orthopedics.      Osteoarthritis.      Discussed with the nursing staff.    Discussed with the ER physician.    Discussed with the  at the bedside        Boyd Landry MD  1/25/2024

## 2024-01-26 ENCOUNTER — APPOINTMENT (OUTPATIENT)
Dept: GENERAL RADIOLOGY | Facility: HOSPITAL | Age: 59
End: 2024-01-26
Attending: INTERNAL MEDICINE
Payer: COMMERCIAL

## 2024-01-26 LAB
BASOPHILS # BLD AUTO: 0.01 X10(3) UL (ref 0–0.2)
BASOPHILS NFR BLD AUTO: 0.1 %
BILIRUB UR QL: NEGATIVE
CLARITY UR: CLEAR
COLOR UR: COLORLESS
DEPRECATED RDW RBC AUTO: 48 FL (ref 35.1–46.3)
EOSINOPHIL # BLD AUTO: 0.01 X10(3) UL (ref 0–0.7)
EOSINOPHIL NFR BLD AUTO: 0.1 %
ERYTHROCYTE [DISTWIDTH] IN BLOOD BY AUTOMATED COUNT: 14.6 % (ref 11–15)
GLUCOSE UR-MCNC: NORMAL MG/DL
HCT VFR BLD AUTO: 26.4 %
HGB BLD-MCNC: 8.4 G/DL
HGB UR QL STRIP.AUTO: NEGATIVE
IMM GRANULOCYTES # BLD AUTO: 0.03 X10(3) UL (ref 0–1)
IMM GRANULOCYTES NFR BLD: 0.3 %
KETONES UR-MCNC: NEGATIVE MG/DL
LACTATE SERPL-SCNC: 1.2 MMOL/L (ref 0.5–2)
LEUKOCYTE ESTERASE UR QL STRIP.AUTO: 250
LYMPHOCYTES # BLD AUTO: 1.24 X10(3) UL (ref 1–4)
LYMPHOCYTES NFR BLD AUTO: 14.3 %
MCH RBC QN AUTO: 28.5 PG (ref 26–34)
MCHC RBC AUTO-ENTMCNC: 31.8 G/DL (ref 31–37)
MCV RBC AUTO: 89.5 FL
MONOCYTES # BLD AUTO: 0.84 X10(3) UL (ref 0.1–1)
MONOCYTES NFR BLD AUTO: 9.7 %
NEUTROPHILS # BLD AUTO: 6.54 X10 (3) UL (ref 1.5–7.7)
NEUTROPHILS # BLD AUTO: 6.54 X10(3) UL (ref 1.5–7.7)
NEUTROPHILS NFR BLD AUTO: 75.5 %
NITRITE UR QL STRIP.AUTO: NEGATIVE
PH UR: 6.5 [PH] (ref 5–8)
PLATELET # BLD AUTO: 243 10(3)UL (ref 150–450)
PROT UR-MCNC: NEGATIVE MG/DL
RBC # BLD AUTO: 2.95 X10(6)UL
SP GR UR STRIP: <1.005 (ref 1–1.03)
UROBILINOGEN UR STRIP-ACNC: NORMAL
WBC # BLD AUTO: 8.7 X10(3) UL (ref 4–11)

## 2024-01-26 PROCEDURE — 85025 COMPLETE CBC W/AUTO DIFF WBC: CPT | Performed by: STUDENT IN AN ORGANIZED HEALTH CARE EDUCATION/TRAINING PROGRAM

## 2024-01-26 PROCEDURE — 87040 BLOOD CULTURE FOR BACTERIA: CPT | Performed by: INTERNAL MEDICINE

## 2024-01-26 PROCEDURE — 71045 X-RAY EXAM CHEST 1 VIEW: CPT | Performed by: INTERNAL MEDICINE

## 2024-01-26 PROCEDURE — 81001 URINALYSIS AUTO W/SCOPE: CPT | Performed by: INTERNAL MEDICINE

## 2024-01-26 PROCEDURE — 83605 ASSAY OF LACTIC ACID: CPT | Performed by: INTERNAL MEDICINE

## 2024-01-26 PROCEDURE — 97162 PT EVAL MOD COMPLEX 30 MIN: CPT

## 2024-01-26 PROCEDURE — 87086 URINE CULTURE/COLONY COUNT: CPT | Performed by: INTERNAL MEDICINE

## 2024-01-26 PROCEDURE — 97530 THERAPEUTIC ACTIVITIES: CPT

## 2024-01-26 RX ORDER — ACETAMINOPHEN 325 MG/1
650 TABLET ORAL EVERY 6 HOURS PRN
Status: DISCONTINUED | OUTPATIENT
Start: 2024-01-26 | End: 2024-01-29

## 2024-01-26 RX ORDER — CYCLOBENZAPRINE HCL 5 MG
5 TABLET ORAL 2 TIMES DAILY PRN
Status: DISCONTINUED | OUTPATIENT
Start: 2024-01-26 | End: 2024-01-29

## 2024-01-26 RX ORDER — DIPHENHYDRAMINE HCL 25 MG
25 CAPSULE ORAL 3 TIMES DAILY PRN
Status: DISCONTINUED | OUTPATIENT
Start: 2024-01-26 | End: 2024-01-29

## 2024-01-26 NOTE — PLAN OF CARE
Patient alert x4. NPO for surgery this morning. Bedrest. Voiding in purewick. Family at bedside. Sent patient to surgery in stable condition.  Received patient back from PACU. Post-op day #0. Dressing in place to left leg. Monitoring vital signs- stable at this time. No acute changes noted throughout shift. Receiving IV fluids per MD order. Tolerating diet. Voiding freely. Eliquis to start tomorrow morning per surgeon order. Trmadol and oxy as needed. Encouraged frequent ambulation and use of incentive spirometer. Fall precautions maintained- bed alarm on, bed locked in lowest position, call light and personal belongings within reach, non-skid socks in place to bilateral feet. Frequent rounding by nursing staff. Plan pending PT/OT eval and medical clearance. Addressed additional questions.     Problem: Patient Centered Care  Goal: Patient preferences are identified and integrated in the patient's plan of care  Description: Interventions:  - What would you like us to know as we care for you?   - Provide timely, complete, and accurate information to patient/family  - Incorporate patient and family knowledge, values, beliefs, and cultural backgrounds into the planning and delivery of care  - Encourage patient/family to participate in care and decision-making at the level they choose  - Honor patient and family perspectives and choices  Outcome: Progressing     Problem: Patient/Family Goals  Goal: Patient/Family Long Term Goal  Description: Patient's Long Term Goal: to go  home    Interventions:  - surgery   - follow MD orders  - update patient and family on plan of care   - discharge planning  - PT/OT  - See additional Care Plan goals for specific interventions  Outcome: Progressing  Goal: Patient/Family Short Term Goal  Description: Patient's Short Term Goal: to have less pain    Interventions:   - pain medications as needed  - non-pharmacologic pain interventions  - PT/OT  - See additional Care Plan goals for  specific interventions  Outcome: Progressing     Problem: PAIN - ADULT  Goal: Verbalizes/displays adequate comfort level or patient's stated pain goal  Description: INTERVENTIONS:  - Encourage pt to monitor pain and request assistance  - Assess pain using appropriate pain scale  - Administer analgesics based on type and severity of pain and evaluate response  - Implement non-pharmacological measures as appropriate and evaluate response  - Consider cultural and social influences on pain and pain management  - Manage/alleviate anxiety  - Utilize distraction and/or relaxation techniques  - Monitor for opioid side effects  - Notify MD/LIP if interventions unsuccessful or patient reports new pain  - Anticipate increased pain with activity and pre-medicate as appropriate  Outcome: Progressing     Problem: RISK FOR INFECTION - ADULT  Goal: Absence of fever/infection during anticipated neutropenic period  Description: INTERVENTIONS  - Monitor WBC  - Administer growth factors as ordered  - Implement neutropenic guidelines  Outcome: Progressing     Problem: SAFETY ADULT - FALL  Goal: Free from fall injury  Description: INTERVENTIONS:  - Assess pt frequently for physical needs  - Identify cognitive and physical deficits and behaviors that affect risk of falls.  - [place] fall precautions as indicated by assessment.  - Educate pt/family on patient safety including physical limitations  - Instruct pt to call for assistance with activity based on assessment  - Modify environment to reduce risk of injury  - Provide assistive devices as appropriate  - Consider OT/PT consult to assist with strengthening/mobility  - Encourage toileting schedule  Outcome: Progressing     Problem: DISCHARGE PLANNING  Goal: Discharge to home or other facility with appropriate resources  Description: INTERVENTIONS:  - Identify barriers to discharge w/pt and caregiver  - Include patient/family/discharge partner in discharge planning  - Arrange for  needed discharge resources and transportation as appropriate  - Identify discharge learning needs (meds, wound care, etc)  - Arrange for interpreters to assist at discharge as needed  - Consider post-discharge preferences of patient/family/discharge partner  - Complete POLST form as appropriate  - Assess patient's ability to be responsible for managing their own health  - Refer to Case Management Department for coordinating discharge planning if the patient needs post-hospital services based on physician/LIP order or complex needs related to functional status, cognitive ability or social support system  Outcome: Progressing     Problem: MUSCULOSKELETAL - ADULT  Goal: Return mobility to safest level of function  Description: INTERVENTIONS:  - Assess patient stability and activity tolerance for standing, transferring and ambulating w/ or w/o assistive devices  - Assist with transfers and ambulation using safe patient handling equipment as needed  - Ensure adequate protection for wounds/incisions during mobilization  - Obtain PT/OT consults as needed  - Advance activity as appropriate  - Communicate ordered activity level and limitations with patient/family  Outcome: Progressing

## 2024-01-26 NOTE — PHYSICAL THERAPY NOTE
PHYSICAL THERAPY HIP EVALUATION - INPATIENT     Room Number: 422/422-A  Evaluation Date: 1/26/2024  Type of Evaluation: Initial  Physician Order: PT Eval and Treat    Presenting Problem: fall with left femur fx, s/p IM nail, WBAT  Co-Morbidities : seizure disorder, anemia, HTN, a-fib on AC, sciatica  Reason for Therapy: Mobility Dysfunction and Discharge Planning    PHYSICAL THERAPY ASSESSMENT     Patient is a 58 year old female admitted 1/24/2024 for fall at home on hardwood floor w/inability to walk. Imaging shows left hip greater troch fx w/extension into the IT region on MR now s/p IM nailing, WBAT. Pt reports sore left sided ribs, no fx present. Patient is normally independent and ambulatory without any devices.  Patient's current functional deficits include pain control, bed mobility, activity tolerance, range of motion and strength LLE with significant guarding, transfers, gait, stair navigation, which are below the patient's pre-admission status.  Session is limited by pain and significant muscle guarding and co-contraction of LLE, pt VERY fearful of moving leg at all.  The patient's Approx Degree of Impairment: 72.57% has been calculated based on documentation in the [facility] '6 clicks' Inpatient Basic Mobility Short Form.  Research supports that patients with this level of impairment may benefit from inpatient rehab and this is the current recommendation. Patient is hopeful to go home with  and HH if possible but currently is significantly below her normal functional level and does not have the skills to return home.    KATHIA Carrion approves participation and pain medication coordinated, pt ultimately needing IV meds to be able to participate in session.  Patient presents in bed and initially declines therapy but ultimately with education is agreeable to try to participate. She reports she is VERY FEARFUL that it will hurt. Extensive education about importance of post op mobility including being up to  chair for meals, getting to bathroom with staff and walking throughout day. Discussion that pain will actually improve as she moves her leg and will help with edema as well. Performs minimal AAROM to left hip in bed to encourage moving. She needs max A and significant time to move supine to sitting EOB with therapist supporting LLE. Pt then using RLE to hold LLE up. Assist for gentle AAROM knee flexion to extension. She is able to sit with CGA x 8 min. Performed sit to/from stand x 4 reps to RW with mod A and able to stand for 5-15 seconds while resting LLE on the floor. She is unable to transfer to chair with RW so transferred holding therapist with max A bed to chair to the right. She is not tolerating much weight on the LLE but understands she can put weight down. Recommend OT see patient to assess skills to return home.  Dk is present throughout session and encouraging pt to attempt. Pt may also benefit from a muscle relaxer and RN is aware and reaching out to MD. Patient is up in chair with all needs in reach and ice packs on thigh,  present. RN aware of session and mobility    Patient will benefit from continued IP PT services to address these deficits in preparation for discharge.    DISCHARGE RECOMMENDATIONS  PT Discharge Recommendations: Undetermined (Inpatient rehab [facility] vs [facility]:  pt wants to go home with  and HHPT)    PLAN  PT Treatment Plan: Bed mobility;Patient education;Family education;Gait training;Range of motion;Strengthening;Stoop training;Stair training;Transfer training  Rehab Potential : Good  Frequency (Obs): Daily       PHYSICAL THERAPY MEDICAL/SOCIAL HISTORY     History related to current admission: 58-year-old female with history of seizure disorder on Keppra, anemia, hypertension, atrial fibrillation on DOAC, presents for evaluation of fall.  She got up in the middle the night to use the bathroom and fell.  She is unsure how she fell.  Her family members heard the fall  and came to find her on the ground with her eyes rolled back.  After the fall she had significant left hip pain and was not able to bear any weight.  She still complains of left hip pain and left rib pain.  No loss of strength or sensation.  No chest pain or dyspnea.        Problem List  Principal Problem:    Closed fracture of neck of left femur, initial encounter ([facility])      HOME SITUATION  Home Situation  Type of Home: House  Home Layout: Two level (split level home, 6 steps up or down)  Stairs to Enter : 2 (1+1)  Railing: No  Stairs to Bedroom: 6  Railing: Yes (one railing and a wall)  Lives With: Spouse (Dk)  Drives: Yes  Patient Owned Equipment: Rolling walker  Patient Regularly Uses: Glasses     Prior Level of [place]: Patient is normally independent and active. She does not use any devices, manages home, lives with .    SUBJECTIVE  \"It hurts to much to get up\"    PHYSICAL THERAPY EXAMINATION     OBJECTIVE  Precautions: Limb alert - left  Fall Risk: High fall risk    WEIGHT BEARING RESTRICTION  Weight Bearing Restriction: L lower extremity           L Lower Extremity: Weight Bearing as Tolerated    PAIN ASSESSMENT  Ratin  Location: left thigh, knee up to hip  Management Techniques: Activity promotion;Breathing techniques;Relaxation;Repositioning;Other (Comment) (medicated)    COGNITION  Overall Cognitive Status:  WFL - within functional limits    RANGE OF MOTION AND STRENGTH ASSESSMENT  Upper extremity ROM and strength are within functional limits   Lower extremity ROM is within functional limits except very limited left knee and hip due to pain so pt not willing to move  Lower extremity strength is within functional limits except very limited left knee and hip due to pain so pt not willing to move    BALANCE  Static Sitting: Good  Dynamic Sitting: Fair +  Static Standing: Poor +  Dynamic Standing: Not tested    ACTIVITY TOLERANCE  Pulse: 99  Heart Rate Source: Monitor                   O2  WALK  Oxygen Therapy  SPO2% on Room Air at Rest: 99    AM-PAC '6-Clicks' INPATIENT SHORT FORM - BASIC MOBILITY  How much difficulty does the patient currently have...  Patient Difficulty: Turning over in bed (including adjusting bedclothes, sheets and blankets)?: A Little   Patient Difficulty: Sitting down on and standing up from a chair with arms (e.g., wheelchair, bedside commode, etc.): A Lot   Patient Difficulty: Moving from lying on back to sitting on the side of the bed?: A Lot   How much help from another person does the patient currently need...   Help from Another: Moving to and from a bed to a chair (including a wheelchair)?: A Lot   Help from Another: Need to walk in hospital room?: Total   Help from Another: Climbing 3-5 steps with a railing?: Total     AM-PAC Score:  Raw Score: 11   Approx Degree of Impairment: 72.57%   Standardized Score (AM-PAC Scale): 33.86   CMS Modifier (G-Code): CL    FUNCTIONAL ABILITY STATUS  Functional Mobility/Gait Assessment  Gait Assistance: Not tested (pt in too much pain to try)  Distance (ft): SPT  Assistive Device: Rolling walker    Bed Mobility: max A to move supine to sitting EOB, VERY guarded LLE    Transfers: mod-max A to stand from bed, SPT ONLY holding PT; pt unable to transfer with RW    Exercise/Education Provided:  Bed mobility  Functional activity tolerated  Neuromuscular re-educate  ROM  Strengthening  Transfer training    Patient End of Session: Up in chair;Needs met;Call light within reach;RN aware of session/findings;All patient questions and concerns addressed;Ice applied;Family present;Discussed recommendations with /    CURRENT GOALS    Goals to be met by: 2/524  Patient Goal Patient's self-stated goal is: to go home    Goal #1 Patient is able to demonstrate supine - sit EOB @ level: minimal assistance   Goal #1   Current Status    Goal #2 Patient is able to demonstrate transfers Sit to/from Stand at assistance level: minimal  assistance     Goal #2  Current Status    Goal #3 Patient is able to ambulate 100 feet with assistive device at assistance level: minimal assistance   Goal #3   Current Status    Goal #4 Patient will negotiate 4 stairs/one curb w/ assistive device and moderate assistance   Goal #4   Current Status    Goal #5 Patient verbalizes and/or demonstrates all precautions and safety concerns independently   Goal #5   Current Status    Goal #6 Patient independently performs home exercise program for ROM/strengthening per the instructions provided in preparation for discharge.   Goal #6  Current Status      Patient Evaluation Complexity Level:  History High - 3 or more personal factors and/or co-morbidities   Examination of body systems Moderate - addressing a total of 3 or more elements   Clinical Presentation Moderate - Evolving   Clinical Decision Making Moderate Complexity       Therapeutic Activity: 26 minutes

## 2024-01-26 NOTE — PROGRESS NOTES
[facility]    Progress Note    Denisa Blount Patient Status:  Inpatient    3/4/1965 MRN [id number]   Location [facility] 4W/SW/SE Attending Boyd Landry MD   Hosp Day # 2 PCP YAIR BRO       SUBJECTIVE:    Nursing staff reports no complaints.    Patient is sleeping comfortably.    OBJECTIVE:  Vital signs in last 24 hours:  /62 (BP Location: Right arm)   Pulse 78   Temp 98.8 °F (37.1 °C) (Oral)   Resp 18   Ht 5' 5\" (1.651 m)   Wt 130 lb (59 kg)   SpO2 93%   BMI 21.63 kg/m²     Intake/Output:    Intake/Output Summary (Last 24 hours) at 2024 0906  Last data filed at 2024 0826  Gross per 24 hour   Intake 1120 ml   Output 1200 ml   Net -80 ml       Wt Readings from Last 3 Encounters:   24 130 lb (59 kg)   20 125 lb (56.7 kg)   20 120 lb (54.4 kg)       Exam  Gen: No acute distress, alert   HEENT:   Pallor noted  Pulm: Lungs clear, normal respiratory effort  CV: Heart with regular rate and rhythm, no peripheral edema  Abd: Abdomen soft, nontender, nondistended, no organomegaly, bowel sounds present    Skin: no rashes or lesions  CNS:     Data Review:     Labs:   Lab Results   Component Value Date    WBC 8.7 2024    HGB 8.4 2024    HCT 26.4 2024    .0 2024       LABS  Recent Labs   Lab 24  0959 24  0439 24  0750   RBC 3.46* 3.12* 2.95*   HGB 10.2* 9.0* 8.4*   HCT 31.6* 27.7* 26.4*   MCV 91.3 88.8 89.5   MCH 29.5 28.8 28.5   MCHC 32.3 32.5 31.8   RDW 14.5 14.7 14.6   NEPRELIM 5.01 5.02 6.54   WBC 7.2 7.3 8.7   .0 260.0 243.0   AST 35*  --   --    ALT 19  --   --    GLU 97 109*  --        Imaging:      Meds:   Current Facility-Administered Medications   Medication Dose Route Frequency    ceFAZolin (Ancef) 2 g in 20mL IV syringe premix  2 g Intravenous Q8H    traMADol (Ultram) tab 50 mg  50 mg Oral Q6H PRN    oxyCODONE immediate release tab 5 mg  5 mg Oral Q4H PRN    atorvastatin (Lipitor) tab  20 mg  20 mg Oral Nightly    pantoprazole (Protonix) DR tab 40 mg  40 mg Oral QAM AC    methenamine (Hiprex) tab 1 g  1 g Oral BID    metoprolol tartrate (Lopressor) tab 25 mg  25 mg Oral 2x Daily(Beta Blocker)    apixaban (Eliquis) tab 5 mg  5 mg Oral BID    levETIRAcetam (Keppra) tab 1,000 mg  1,000 mg Oral BID    morphINE PF 2 MG/ML injection 1 mg  1 mg Intravenous Q2H PRN       Assessment  Patient Active Problem List   Diagnosis    Malunion, fracture    Primary localized osteoarthrosis, lower leg    Derangement of posterior horn of medial meniscus    Chondromalacia of patella    Ankle fracture    Hip fracture (HCC)    Degenerative tear of lateral meniscus of left knee    L knee global 6/14/14    Closed fracture of neck of left femur, initial encounter ([facility])     Active Orders   Nourishments    Room Service Eligibility Until Discontinued     Frequency: Until Discontinued     Number of Occurrences: Until Specified    Room Service Notify RN Until Discontinued     Frequency: Until Discontinued     Number of Occurrences: Until Specified   PT    IP PT eval and treat     Frequency: Once     Number of Occurrences: 1 Occurrences   Microbiology    Clostridium difficile(toxigenic)PCR     Frequency: Once     Number of Occurrences: 1 Occurrences   Diet    Regular/General diet Is Patient on Accuchecks? No; Misc Restriction: Cardiac     Frequency: Effective Now     Number of Occurrences: Until Specified   Nursing    Initiate electrolyte protocol     Frequency: Until Discontinued     Number of Occurrences: Until Specified    Verify informed consent     Frequency: Once     Number of Occurrences: 1 Occurrences    Vital signs     Frequency: Per Unit Routine     Number of Occurrences: 2 Hours     Order Comments: ED holding order only  Cancel if other admission orders exist!       Consult    ED Consult to Orthopedic Surgery     Frequency: Once     Number of Occurrences: 1 Occurrences    ED Consult to Orthopedic Surgery     Frequency:  Once     Number of Occurrences: 1 Occurrences    ED Consult to Primary Care/Medicine     Frequency: Once     Number of Occurrences: 1 Occurrences   Isolation    Enteric/Contact PLUS Isolation Continuous     Frequency: Continuous     Number of Occurrences: Until Specified   Medications    apixaban (Eliquis) tab 5 mg     Frequency: BID     Dose: 5 mg     Route: Oral    atorvastatin (Lipitor) tab 20 mg     Frequency: Nightly     Dose: 20 mg     Route: Oral    ceFAZolin (Ancef) 2 g in 20mL IV syringe premix     Frequency: Q8H     Dose: 2 g     Route: Intravenous    diphenhydrAMINE (Benadryl) cap/tab 25 mg     Frequency: TID PRN     Dose: 25 mg     Route: Oral    levETIRAcetam (Keppra) tab 1,000 mg     Frequency: BID     Dose: 1,000 mg     Route: Oral    methenamine (Hiprex) tab 1 g     Frequency: BID     Dose: 1 g     Route: Oral    metoprolol tartrate (Lopressor) tab 25 mg     Frequency: 2x Daily(Beta Blocker)     Dose: 25 mg     Route: Oral    morphINE PF 2 MG/ML injection 1 mg     Frequency: Q2H PRN     Dose: 1 mg     Route: Intravenous    oxyCODONE immediate release tab 5 mg     Frequency: Q4H PRN     Dose: 5 mg     Route: Oral    pantoprazole (Protonix) DR tab 40 mg     Frequency: QAM AC     Dose: 40 mg     Route: Oral    traMADol (Ultram) tab 50 mg     Frequency: Q6H PRN     Dose: 50 mg     Route: Oral       Do current treatment.    Discussed with the nursing staff.     Boyd Landry MD

## 2024-01-26 NOTE — PROGRESS NOTES
[facility]    Progress Note    Denisa Blount Patient Status:  Inpatient    3/4/1965 MRN [id number]   Location [facility] 4W/SW/SE Attending Boyd Landry MD   Hosp Day # 2 PCP YAIR BRO        Subjective:   Denisa Blount is a(n) 58 year old female now POD 1 s/p L hip CMN for GT fx with extension in to the IT region on MRI. She has been doing well overnight. Her pain is controlled. She has not worked with PT yet. No nausea, no dizziness      Objective:   Vital Signs:  Blood pressure 110/67, pulse 85, temperature 98.2 °F (36.8 °C), temperature source Oral, resp. rate 16, height 5' 5\" (1.651 m), weight 130 lb (59 kg), SpO2 96%.     Physical Exam:   General: Alert, orientated x3.  Cooperative.  No apparent distress.  Left leg  Dressings CDI, no erythema, no fluctuance  Thigh is soft and compressible  SILT s/s/sp/dp/t  EHL/FHL/GS/TA intact  2+ DP pulse  No pain in the knee or ankle at this time  No pain with logroll          Assessment and Plan:    57yo F with prior R hip fracture and afib on eliquis with a left GT fx with extension into the intertrochanteric region on MRI now s/p L hip CMN (24) for the IT extension    WBAT  PT to mobilize  DVT ppx to start POD 1, can restart Eliquis on POD 1 (today)  Ancef x 23hrs (to be completed today)  Suture removal at 2 weeks  SW and CM for discharge planning  CBC today to establish postop baseline given baseline anemia preop    Follow up in 2 weeks with Dr. Narinder Ewing at [facility] ([phone])                 Results:     Lab Results   Component Value Date    WBC 7.3 2024    HGB 9.0 (L) 2024    HCT 27.7 (L) 2024    .0 2024    CREATSERUM 0.66 2024    BUN 13 2024     2024    K 4.0 2024     2024    CO2 27.0 2024     (H) 2024    CA 8.8 2024    ALB 3.8 2024    ALKPHO 88 2024    BILT 0.2 (L) 2024    TP 7.1  01/24/2024    AST 35 (H) 01/24/2024    ALT 19 01/24/2024    TSH 1.122 05/05/2008    MG 1.5 (L) 01/24/2024       XR FLUOROSCOPY C-ARM TIME LESS THAN 1 HOUR (CPT=76000)    Result Date: 1/25/2024  CONCLUSION:  Intraoperative fluoroscopy was utilized.    Dictated by (CST): Ronnie Keita MD on 1/25/2024 at 2:59 PM     Finalized by (CST): Ronnie Keita MD on 1/25/2024 at 3:01 PM          MRI HIP LIMITED FX (2) SEQUENCES LEFT (CPT = 61273)    Result Date: 1/25/2024  CONCLUSION:  1. Acute nondisplaced fracture at the left femoral greater trochanter with extension to the intratrochanteric region and posterior femoral neck.  Intratrochanteric extension was occult on prior hip radiographs. 2. Associated edema and/or hematoma within the left gluteal musculature as well as a moderate left hip joint effusion, findings are likely reactive to the aforementioned femoral fracture. 3. Moderate left hip osteoarthritis. 4. Chronic/healed bilateral pubic ramus fractures. 5. Partially imaged susceptibility artifacts from right proximal femoral internal fixation.   A preliminary report was issued by the [facility] teleradiology service. There are no major discrepancies.  elm-remote  Dictated by (CST): Milad Holt MD on 1/25/2024 at 7:21 AM     Finalized by (CST): Milad Holt MD on 1/25/2024 at 7:26 AM          XR CHEST AP PORTABLE  (CPT=71045)    Result Date: 1/24/2024  CONCLUSION:  1. No acute disease in the chest.  Minimal linear left basal scarring/atelectasis.  Old healed bilateral rib fractures.    Dictated by (CST): Apolinar Galloway MD on 1/24/2024 at 1:13 PM     Finalized by (CST): Apolinar Galloway MD on 1/24/2024 at 1:18 PM          CT HIP(BONE) LEFT (CPT=73700)    Result Date: 1/24/2024  CONCLUSION:   Incomplete nondisplaced fracture at the posterior femoral neck.  Comminuted nondisplaced fracture of the greater trochanter.  Mild left hip osteoarthritis.  This report was communicated by telephone to Dr. Mckeon on  1/24/2024 at 1235 hours.        Dictated by (CST): Lorenzo Eastman MD on 1/24/2024 at 12:30 PM     Finalized by (CST): Lorenzo Eastman MD on 1/24/2024 at 12:36 PM          XR RIBS WITH CHEST (3 VIEWS), LEFT  (CPT=71101)    Result Date: 1/24/2024  CONCLUSION:  1. No acute appearing left rib fractures.  Chronic healed rib fracture deformities on the left as described above.  Additional less well visualized fractures of the posterolateral right 3rd and 4th ribs.  Correlate clinically.    Dictated by (CST): Apolinar Galloway MD on 1/24/2024 at 11:25 AM     Finalized by (CST): Apolinar Galloway MD on 1/24/2024 at 11:30 AM          XR HIP W OR WO PELVIS 2 OR 3 VIEWS, LEFT (CPT=73502)    Result Date: 1/24/2024  CONCLUSION:  1. There is a poorly visualized incomplete appearing vertical fracture through the lateral aspect of the left greater trochanter of the left hip extending inferiorly to the subtrochanteric region of the proximal left femoral shaft.  This is incomplete and nondisplaced.  Recommend CT scanning to further assess its true extent. 2. Chronic fracture deformity of the superior pubic ramus on the right.  3. Lesser findings as described above.    Dictated by (CST): Apolinar Galloway MD on 1/24/2024 at 11:19 AM     Finalized by (CST): Apolinar Galloway MD on 1/24/2024 at 11:24 AM          CT BRAIN OR HEAD (34926)    Result Date: 1/24/2024  CONCLUSION:  1. No acute intracranial process by noncontrast CT technique. 2. Mild scattered intracranial atherosclerosis. 3. Left sphenoid sinus mucosal thickening. 4. Lesser incidental findings as above.   Dictated by (CST): Milad Holt MD on 1/24/2024 at 11:20 AM     Finalized by (CST): Milad Holt MD on 1/24/2024 at 11:22 AM         EKG 12 Lead    Result Date: 1/24/2024  Normal sinus rhythm Normal ECG No previous ECGs found in [facility] Confirmed by ISH MATHIAS ELMER ([id number]) on 1/24/2024 2:54:52 PM

## 2024-01-26 NOTE — PROGRESS NOTES
[facility]    Progress Note    Denisa OWUSU Jose Alejandro Patient Status:  Inpatient    3/4/1965 MRN [id number]   Location [facility] 4W/SW/SE Attending Boyd Landry MD   Hosp Day # 2 PCP YAIR BRO       SUBJECTIVE:    Patient had a fever earlier.    Denies any other complaints.    Denies any cough.    No urinary symptoms.    No abdominal pain.        OBJECTIVE:  Vital signs in last 24 hours:  /62 (BP Location: Right arm)   Pulse 78   Temp 98.8 °F (37.1 °C) (Oral)   Resp 18   Ht 5' 5\" (1.651 m)   Wt 130 lb (59 kg)   SpO2 93%   BMI 21.63 kg/m²     Intake/Output:    Intake/Output Summary (Last 24 hours) at 2024 0921  Last data filed at 2024 0826  Gross per 24 hour   Intake 1120 ml   Output 1200 ml   Net -80 ml       Wt Readings from Last 3 Encounters:   24 130 lb (59 kg)   20 125 lb (56.7 kg)   20 120 lb (54.4 kg)       Exam  Gen: No acute distress, alert   HEENT:   Pallor noted  Pulm: Lungs clear, normal respiratory effort  CV: Heart with regular rate and rhythm, no peripheral edema  Abd: Abdomen soft, nontender, nondistended, no organomegaly, bowel sounds present    Skin: no rashes or lesions  CNS:     Data Review:     Labs:   Lab Results   Component Value Date    WBC 8.7 2024    HGB 8.4 2024    HCT 26.4 2024    .0 2024       LABS  Recent Labs   Lab 24  0959 24  0439 24  0750   RBC 3.46* 3.12* 2.95*   HGB 10.2* 9.0* 8.4*   HCT 31.6* 27.7* 26.4*   MCV 91.3 88.8 89.5   MCH 29.5 28.8 28.5   MCHC 32.3 32.5 31.8   RDW 14.5 14.7 14.6   NEPRELIM 5.01 5.02 6.54   WBC 7.2 7.3 8.7   .0 260.0 243.0   AST 35*  --   --    ALT 19  --   --    GLU 97 109*  --        Imaging:      Meds:         Assessment  Patient Active Problem List   Diagnosis    Malunion, fracture    Primary localized osteoarthrosis, lower leg    Derangement of posterior horn of medial meniscus    Chondromalacia of patella    Ankle fracture     Hip fracture (HCC)    Degenerative tear of lateral meniscus of left knee    L knee global 6/14/14    Closed fracture of neck of left femur, initial encounter ([facility])     Active Orders   Nourishments    Room Service Eligibility Until Discontinued     Frequency: Until Discontinued     Number of Occurrences: Until Specified    Room Service Notify RN Until Discontinued     Frequency: Until Discontinued     Number of Occurrences: Until Specified   PT    IP PT eval and treat     Frequency: Once     Number of Occurrences: 1 Occurrences   Microbiology    Clostridium difficile(toxigenic)PCR     Frequency: Once     Number of Occurrences: 1 Occurrences   Diet    Regular/General diet Is Patient on Accuchecks? No; Misc Restriction: Cardiac     Frequency: Effective Now     Number of Occurrences: Until Specified   Nursing    Initiate electrolyte protocol     Frequency: Until Discontinued     Number of Occurrences: Until Specified    Verify informed consent     Frequency: Once     Number of Occurrences: 1 Occurrences    Vital signs     Frequency: Per Unit Routine     Number of Occurrences: 2 Hours     Order Comments: ED holding order only  Cancel if other admission orders exist!       Consult    ED Consult to Orthopedic Surgery     Frequency: Once     Number of Occurrences: 1 Occurrences    ED Consult to Orthopedic Surgery     Frequency: Once     Number of Occurrences: 1 Occurrences    ED Consult to Primary Care/Medicine     Frequency: Once     Number of Occurrences: 1 Occurrences   Isolation    Enteric/Contact PLUS Isolation Continuous     Frequency: Continuous     Number of Occurrences: Until Specified   Medications    apixaban (Eliquis) tab 5 mg     Frequency: BID     Dose: 5 mg     Route: Oral    atorvastatin (Lipitor) tab 20 mg     Frequency: Nightly     Dose: 20 mg     Route: Oral    ceFAZolin (Ancef) 2 g in 20mL IV syringe premix     Frequency: Q8H     Dose: 2 g     Route: Intravenous    diphenhydrAMINE (Benadryl) cap/tab 25  mg     Frequency: TID PRN     Dose: 25 mg     Route: Oral    levETIRAcetam (Keppra) tab 1,000 mg     Frequency: BID     Dose: 1,000 mg     Route: Oral    methenamine (Hiprex) tab 1 g     Frequency: BID     Dose: 1 g     Route: Oral    metoprolol tartrate (Lopressor) tab 25 mg     Frequency: 2x Daily(Beta Blocker)     Dose: 25 mg     Route: Oral    morphINE PF 2 MG/ML injection 1 mg     Frequency: Q2H PRN     Dose: 1 mg     Route: Intravenous    oxyCODONE immediate release tab 5 mg     Frequency: Q4H PRN     Dose: 5 mg     Route: Oral    pantoprazole (Protonix) DR tab 40 mg     Frequency: QAM AC     Dose: 40 mg     Route: Oral    traMADol (Ultram) tab 50 mg     Frequency: Q6H PRN     Dose: 50 mg     Route: Oral      Fever.    Could be secondary to postoperative atelectasis.    Fever workup ordered.    Chest x-ray.    UA.    Blood cultures.      Seizure disorder.  Continue current treatment.      Discussed with the nursing staff.     Boyd Landry MD

## 2024-01-26 NOTE — PLAN OF CARE
Patient AOX4. Room air. General diet, tolerating well. Pt refusing SCDs. Eliquis for prophylaxis. Pt complaint of generalized itching, PRN benadryl given. PRN oxy and morphine given for pain management. Fever this afternoon, MD notified. See orders PRN tylenol given. Pt was educated on the importance of mobility. PRN purewick in place. Call light within reach.     Problem: Patient Centered Care  Goal: Patient preferences are identified and integrated in the patient's plan of care  Description: Interventions:  - What would you like us to know as we care for you?   - Provide timely, complete, and accurate information to patient/family  - Incorporate patient and family knowledge, values, beliefs, and cultural backgrounds into the planning and delivery of care  - Encourage patient/family to participate in care and decision-making at the level they choose  - Honor patient and family perspectives and choices  Outcome: Progressing     Problem: Patient/Family Goals  Goal: Patient/Family Long Term Goal  Description: Patient's Long Term Goal: to go  home    Interventions:  - surgery   - follow MD orders  - update patient and family on plan of care   - discharge planning  - PT/OT  - See additional Care Plan goals for specific interventions  Outcome: Progressing  Goal: Patient/Family Short Term Goal  Description: Patient's Short Term Goal: to have less pain    Interventions:   - pain medications as needed  - non-pharmacologic pain interventions  - PT/OT  - See additional Care Plan goals for specific interventions  Outcome: Progressing     Problem: PAIN - ADULT  Goal: Verbalizes/displays adequate comfort level or patient's stated pain goal  Description: INTERVENTIONS:  - Encourage pt to monitor pain and request assistance  - Assess pain using appropriate pain scale  - Administer analgesics based on type and severity of pain and evaluate response  - Implement non-pharmacological measures as appropriate and evaluate response  -  Consider cultural and social influences on pain and pain management  - Manage/alleviate anxiety  - Utilize distraction and/or relaxation techniques  - Monitor for opioid side effects  - Notify MD/LIP if interventions unsuccessful or patient reports new pain  - Anticipate increased pain with activity and pre-medicate as appropriate  Outcome: Progressing     Problem: RISK FOR INFECTION - ADULT  Goal: Absence of fever/infection during anticipated neutropenic period  Description: INTERVENTIONS  - Monitor WBC  - Administer growth factors as ordered  - Implement neutropenic guidelines  Outcome: Progressing     Problem: SAFETY ADULT - FALL  Goal: Free from fall injury  Description: INTERVENTIONS:  - Assess pt frequently for physical needs  - Identify cognitive and physical deficits and behaviors that affect risk of falls.  - [place] fall precautions as indicated by assessment.  - Educate pt/family on patient safety including physical limitations  - Instruct pt to call for assistance with activity based on assessment  - Modify environment to reduce risk of injury  - Provide assistive devices as appropriate  - Consider OT/PT consult to assist with strengthening/mobility  - Encourage toileting schedule  Outcome: Progressing     Problem: DISCHARGE PLANNING  Goal: Discharge to home or other facility with appropriate resources  Description: INTERVENTIONS:  - Identify barriers to discharge w/pt and caregiver  - Include patient/family/discharge partner in discharge planning  - Arrange for needed discharge resources and transportation as appropriate  - Identify discharge learning needs (meds, wound care, etc)  - Arrange for interpreters to assist at discharge as needed  - Consider post-discharge preferences of patient/family/discharge partner  - Complete POLST form as appropriate  - Assess patient's ability to be responsible for managing their own health  - Refer to Case Management Department for coordinating discharge planning  if the patient needs post-hospital services based on physician/LIP order or complex needs related to functional status, cognitive ability or social support system  Outcome: Progressing     Problem: MUSCULOSKELETAL - ADULT  Goal: Return mobility to safest level of function  Description: INTERVENTIONS:  - Assess patient stability and activity tolerance for standing, transferring and ambulating w/ or w/o assistive devices  - Assist with transfers and ambulation using safe patient handling equipment as needed  - Ensure adequate protection for wounds/incisions during mobilization  - Obtain PT/OT consults as needed  - Advance activity as appropriate  - Communicate ordered activity level and limitations with patient/family  Outcome: Progressing

## 2024-01-27 LAB
ANION GAP SERPL CALC-SCNC: 10 MMOL/L (ref 0–18)
BASOPHILS # BLD AUTO: 0.04 X10(3) UL (ref 0–0.2)
BASOPHILS NFR BLD AUTO: 0.6 %
BUN BLD-MCNC: 11 MG/DL (ref 9–23)
BUN/CREAT SERPL: 17.7 (ref 10–20)
CALCIUM BLD-MCNC: 8.6 MG/DL (ref 8.7–10.4)
CHLORIDE SERPL-SCNC: 102 MMOL/L (ref 98–112)
CO2 SERPL-SCNC: 26 MMOL/L (ref 21–32)
CREAT BLD-MCNC: 0.62 MG/DL
DEPRECATED RDW RBC AUTO: 48.1 FL (ref 35.1–46.3)
EGFRCR SERPLBLD CKD-EPI 2021: 103 ML/MIN/1.73M2 (ref 60–?)
EOSINOPHIL # BLD AUTO: 0.2 X10(3) UL (ref 0–0.7)
EOSINOPHIL NFR BLD AUTO: 2.8 %
ERYTHROCYTE [DISTWIDTH] IN BLOOD BY AUTOMATED COUNT: 14.5 % (ref 11–15)
GLUCOSE BLD-MCNC: 96 MG/DL (ref 70–99)
HCT VFR BLD AUTO: 25.9 %
HGB BLD-MCNC: 8.1 G/DL
IMM GRANULOCYTES # BLD AUTO: 0.02 X10(3) UL (ref 0–1)
IMM GRANULOCYTES NFR BLD: 0.3 %
LYMPHOCYTES # BLD AUTO: 1.39 X10(3) UL (ref 1–4)
LYMPHOCYTES NFR BLD AUTO: 19.5 %
MCH RBC QN AUTO: 28.3 PG (ref 26–34)
MCHC RBC AUTO-ENTMCNC: 31.3 G/DL (ref 31–37)
MCV RBC AUTO: 90.6 FL
MONOCYTES # BLD AUTO: 0.78 X10(3) UL (ref 0.1–1)
MONOCYTES NFR BLD AUTO: 11 %
NEUTROPHILS # BLD AUTO: 4.68 X10 (3) UL (ref 1.5–7.7)
NEUTROPHILS # BLD AUTO: 4.68 X10(3) UL (ref 1.5–7.7)
NEUTROPHILS NFR BLD AUTO: 65.8 %
OSMOLALITY SERPL CALC.SUM OF ELEC: 285 MOSM/KG (ref 275–295)
PLATELET # BLD AUTO: 233 10(3)UL (ref 150–450)
POTASSIUM SERPL-SCNC: 3.3 MMOL/L (ref 3.5–5.1)
POTASSIUM SERPL-SCNC: 4 MMOL/L (ref 3.5–5.1)
RBC # BLD AUTO: 2.86 X10(6)UL
SODIUM SERPL-SCNC: 138 MMOL/L (ref 136–145)
WBC # BLD AUTO: 7.1 X10(3) UL (ref 4–11)

## 2024-01-27 PROCEDURE — 97530 THERAPEUTIC ACTIVITIES: CPT

## 2024-01-27 PROCEDURE — 85025 COMPLETE CBC W/AUTO DIFF WBC: CPT | Performed by: INTERNAL MEDICINE

## 2024-01-27 PROCEDURE — 97116 GAIT TRAINING THERAPY: CPT

## 2024-01-27 PROCEDURE — 84132 ASSAY OF SERUM POTASSIUM: CPT | Performed by: INTERNAL MEDICINE

## 2024-01-27 PROCEDURE — 97166 OT EVAL MOD COMPLEX 45 MIN: CPT

## 2024-01-27 PROCEDURE — 97535 SELF CARE MNGMENT TRAINING: CPT

## 2024-01-27 PROCEDURE — 80048 BASIC METABOLIC PNL TOTAL CA: CPT | Performed by: INTERNAL MEDICINE

## 2024-01-27 RX ORDER — POTASSIUM CHLORIDE 20 MEQ/1
40 TABLET, EXTENDED RELEASE ORAL EVERY 4 HOURS
Status: COMPLETED | OUTPATIENT
Start: 2024-01-27 | End: 2024-01-27

## 2024-01-27 NOTE — CM/SW NOTE
MARKO followed up on DC planning.     MARKO saw pt was self pay, however per documentation from [facility] [employer], pt reports having BCBS [place] through [facility].     SW called and spoke with registration who could not pull the data and update the chart. Registration states she will call pt or spouse to confirm insurance and will update chart if able    SW spoke with PT Luz regarding pt's progress, pt is self limiting and fearful. Pt is hoping to DC home with HHC however therapy is recommending [facility] if pt does not improve    Luz stating will see how pt improves tomorrow as pt should be able to discharge home if able and willing to participate more     Tent referrals sent for HHC and [facility] - will need to update insurance info as well    Needs PASRR if [facility] and discussion with pt pending progress    Sidra Branham, LSW, MSW ext. [phone]

## 2024-01-27 NOTE — OCCUPATIONAL THERAPY NOTE
OCCUPATIONAL THERAPY EVALUATION - INPATIENT     Room Number: 422/422-A  Evaluation Date: 1/27/2024  Type of Evaluation: Initial  Presenting Problem: fall c L femur fracture s/p IM nailing    Physician Order: IP Consult to Occupational Therapy  Reason for Therapy: ADL/IADL Dysfunction and Discharge Planning    OCCUPATIONAL THERAPY ASSESSMENT   Patient is a 58 year old female admitted 1/24/2024 for fall c L femur fracture s/p IM nailing. Patient chart reviewed and therapist consulted with RN prior to seeing patient. Patient supine in bed at beginning of session, but agreeable to participate in therapy on this date.  Prior to admission, patient was independent with all ADLs and IADLs. Patient lives in a house with . Patient was driving and currently does work. Patient used no assistive device for mobility.    Functional Mobility: Patient is able to complete supine<sit with min Ax2. Patient is able to maintain sitting EOB with min A to manage LLE due to pain. Patient completes sit<>stand transfers with mod A x2 and within room functional mobility with mod A x2 to SPT. Patient able to take 2 steps forward with RW before requesting to sit down. Patient presents with difficulty putting weight through LLE. SPT to chair with assist to keep LLE off floor due to pain.    ADLs: Patient requires max A for LB dressing and toileting this session. Patient demonstrates poor standing balance to manage clothes in standing for dressing and toileting.     Observations: Patient is most limited this session throughout by pain management. Education provide on body mechanics and how those manifest functionally while completing ADLs and functional mobility. Education provided to patient on adaptive dressing techniques, as well as importance of OOB activity and energy conservation upon returning home. Patient verbalizes and demonstrates good understanding of all education.     It is recommended that patient return to bed with use of  gait belt and assist of 2 via SPTfor safety. Alarm left on and call light within reach. Instructed to call nursing staff before getting up.     In this OT evaluation patient presents with the following impairments: balance, strength, endurance, pain management.  These deficits manifest functionally while performing ADLs and functional mobility.   The patient is below baseline and would benefit from skilled inpatient OT to address the above deficits, maximizing patient's ability to return to prior level of function.    The patient's Approx Degree of Impairment: 56.46% has been calculated based on documentation in the [facility] '6 clicks' Inpatient Daily Activity Short Form.  Research supports that patients with this level of impairment may benefit from Acute Rehab. Prior to admission, patient was independent with no assistive device, working out 4 days a week and working part time at  [employer].    DISCHARGE RECOMMENDATIONS  OT Discharge Recommendations: Acute rehabilitation  OT Device Recommendations: TBD    PLAN  OT Treatment Plan: Balance activities;Energy conservation/work simplification techniques;ADL training;IADL training;Functional transfer training;UE strengthening/ROM;Endurance training;Patient/Family education;Patient/Family training;Equipment eval/education;Neuromuscluar reeducation;Compensatory technique education       OCCUPATIONAL THERAPY MEDICAL/SOCIAL HISTORY   Problem List   Principal Problem:    Closed fracture of neck of left femur, initial encounter ([facility])    HOME SITUATION  Type of Home: House  Home Layout: Multi-level (split level)  Lives With: Spouse  Occupation/Status: works at  [employer]  Hand Dominance: Right  Drives: Yes  Patient Regularly Uses: Glasses    Stairs in Home: 6 up and 6 down  Assistive Device(s) Used: none     Prior Level of [place]: Prior to admission, patient was independent with all ADLs and IADLs. Patient lives in a house with . Patient was driving and currently  does work. Patient used no assistive device for mobility.    SUBJECTIVE  \"I can't put any weight through it.\"    OCCUPATIONAL THERAPY EXAMINATION   OBJECTIVE  Precautions: Limb alert - left  Fall Risk: High fall risk    WEIGHT BEARING RESTRICTION  L Lower Extremity: Weight Bearing as Tolerated    PAIN ASSESSMENT  Rating: 10  Location: LLE  Management Techniques: Activity promotion; Body mechanics; Relaxation; Nurse notified; Repositioning (pain meds given prior to session)    COGNITION  Overall Cognitive Status:  WFL - within functional limits    RANGE OF MOTION   Upper extremity ROM is within functional limits     STRENGTH ASSESSMENT  Upper extremity strength is within functional limits     ACTIVITIES OF DAILY LIVING ASSESSMENT  AM-PAC ‘6-Clicks’ Inpatient Daily Activity Short Form  How much help from another person does the patient currently need…  -   Putting on and taking off regular lower body clothing?: A Lot  -   Bathing (including washing, rinsing, drying)?: A Lot  -   Toileting, which includes using toilet, bedpan or urinal? : A Lot  -   Putting on and taking off regular upper body clothing?: A Little  -   Taking care of personal grooming such as brushing teeth?: A Little  -   Eating meals?: A Little    AM-PAC Score:  Score: 15  Approx Degree of Impairment: 56.46%  Standardized Score (AM-PAC Scale): 34.69  CMS Modifier (G-Code): CK    FUNCTIONAL TRANSFER ASSESSMENT  Sit to Stand: Edge of Bed  Edge of Bed: Maximum Assist (x2)    BED MOBILITY  Supine to Sit : Minimal Assist (x2)    FUNCTIONAL ADL ASSESSMENT  Eating: Stand-by Assist  Grooming Seated: Stand-by Assist  Bathing Seated: Moderate Assist  UB Dressing Seated: Minimal Assist  LB Dressing Seated: Maximum Assist  Toileting Seated: Maximum Assist      EDUCATION PROVIDED  Patient : Role of Occupational Therapy; Plan of Care; Discharge Recommendations; Adaptive Equipment Recommendations; DME Recommendations; Functional Transfer Techniques; Fall Prevention;  Weight Bear Status; Surgical Precautions; Posture/Positioning; Proper Body Mechanics; Compensatory ADL Techniques; Energy Conservation  Patient's Response to Education: Verbalized Understanding; Returned Demonstration    Patient End of Session: Up in chair;Needs met;Call light within reach;RN aware of session/findings;All patient questions and concerns addressed;Alarm set    OT Goals  Patient self-stated goal is: to go home     Patient will complete LE dressing with mod I  Comment:     Patient will complete toilet transfer with mod I  Comment:     Patient will complete self care task at sink level with mod I   Comment:    Patient will complete bed mobility with mod I  Comment:         Goals  on: 1/10/24  Frequency: 5x/week    Patient Evaluation Complexity Level:   Occupational Profile/Medical History MODERATE - Expanded review of history including review of medical or therapy record   Specific performance deficits impacting engagement in ADL/IADL MODERATE  3 - 5 performance deficits   Client Assessment/Performance Deficits MODERATE - Comorbidities and min to mod modifications of tasks    Clinical Decision Making MODERATE - Analysis of occupational profile, detailed assessments, several treatment options    Overall Complexity MODERATE     Self-Care Home Management: 15 minutes  Therapeutic Activity: 8 minutes    Kerri Little OTR/L  [facility]  [id number]

## 2024-01-27 NOTE — PROGRESS NOTES
[facility]    Progress Note    Denisa OWUSU Jose Alejandro Patient Status:  Inpatient    3/4/1965 MRN [id number]   Location [facility] 4W/SW/SE Attending Boyd Landry MD   Hosp Day # 3 PCP YAIR BRO       SUBJECTIVE:     No fever no chills.    No chest pain no shortness of breath.    OBJECTIVE:  Vital signs in last 24 hours:  /64 (BP Location: Right arm)   Pulse 86   Temp 98.5 °F (36.9 °C) (Oral)   Resp 18   Ht 5' 5\" (1.651 m)   Wt 130 lb (59 kg)   SpO2 94%   BMI 21.63 kg/m²     Intake/Output:    Intake/Output Summary (Last 24 hours) at 2024 1007  Last data filed at 2024 0428  Gross per 24 hour   Intake 240 ml   Output 1750 ml   Net -1510 ml       Wt Readings from Last 3 Encounters:   24 130 lb (59 kg)   20 125 lb (56.7 kg)   20 120 lb (54.4 kg)       Exam  Gen: No acute distress, alert   HEENT:   Pallor noted  Pulm: Lungs clear, normal respiratory effort  CV: Heart with regular rate and rhythm, no peripheral edema  Abd: Abdomen soft, nontender, nondistended, no organomegaly, bowel sounds present    Skin: no rashes or lesions  CNS:   Alert    Data Review:     Labs:   Lab Results   Component Value Date    WBC 7.1 2024    HGB 8.1 2024    HCT 25.9 2024    .0 2024    CREATSERUM 0.62 2024    BUN 11 2024     2024    K 3.3 2024     2024    CO2 26.0 2024    GLU 96 2024    CA 8.6 2024       LABS  Recent Labs   Lab 24  0959 24  0439 24  0750 24  0454   RBC 3.46* 3.12* 2.95* 2.86*   HGB 10.2* 9.0* 8.4* 8.1*   HCT 31.6* 27.7* 26.4* 25.9*   MCV 91.3 88.8 89.5 90.6   MCH 29.5 28.8 28.5 28.3   MCHC 32.3 32.5 31.8 31.3   RDW 14.5 14.7 14.6 14.5   NEPRELIM 5.01 5.02 6.54 4.68   WBC 7.2 7.3 8.7 7.1   .0 260.0 243.0 233.0   AST 35*  --   --   --    ALT 19  --   --   --    GLU 97 109*  --  96       Imaging:      Meds:         Assessment  Patient Active  Problem List   Diagnosis    Malunion, fracture    Primary localized osteoarthrosis, lower leg    Derangement of posterior horn of medial meniscus    Chondromalacia of patella    Ankle fracture    Hip fracture ([facility])    Degenerative tear of lateral meniscus of left knee    L knee global 6/14/14    Closed fracture of neck of left femur, initial encounter ([facility])     Active Orders   LAB    Potassium     Frequency: Timed draw     Number of Occurrences: 1 Occurrences     Order Comments: DO NOT DISCONTINUE MUST REMAIN FOR ELECTROLYTE PROTOCOL       Nourishments    Room Service Eligibility Until Discontinued     Frequency: Until Discontinued     Number of Occurrences: Until Specified    Room Service Notify RN Until Discontinued     Frequency: Until Discontinued     Number of Occurrences: Until Specified   OT    OT Eval and Treat     Frequency: Once     Number of Occurrences: 1 Occurrences   Diet    Regular/General diet Is Patient on Accuchecks? No; Misc Restriction: Cardiac     Frequency: Effective Now     Number of Occurrences: Until Specified   Nursing    Initiate electrolyte protocol     Frequency: Until Discontinued     Number of Occurrences: Until Specified    Verify informed consent     Frequency: Once     Number of Occurrences: 1 Occurrences    Vital signs     Frequency: Per Unit Routine     Number of Occurrences: 2 Hours     Order Comments: ED holding order only  Cancel if other admission orders exist!       Consult    ED Consult to Orthopedic Surgery     Frequency: Once     Number of Occurrences: 1 Occurrences    ED Consult to Orthopedic Surgery     Frequency: Once     Number of Occurrences: 1 Occurrences    ED Consult to Primary Care/Medicine     Frequency: Once     Number of Occurrences: 1 Occurrences   Isolation    Enteric/Contact PLUS Isolation Continuous     Frequency: Continuous     Number of Occurrences: Until Specified   Medications    acetaminophen (Tylenol) tab 650 mg     Frequency: Q6H PRN     Dose: 650 mg      Route: Oral    apixaban (Eliquis) tab 5 mg     Frequency: BID     Dose: 5 mg     Route: Oral    atorvastatin (Lipitor) tab 20 mg     Frequency: Nightly     Dose: 20 mg     Route: Oral    cefTRIAXone (Rocephin) 1 g in D5W 100 mL IVPB-ADD     Frequency: Q24H     Dose: 1 g     Route: Intravenous    cyclobenzaprine (Flexeril) tab 5 mg     Frequency: BID PRN     Dose: 5 mg     Route: Oral    diphenhydrAMINE (Benadryl) cap/tab 25 mg     Frequency: TID PRN     Dose: 25 mg     Route: Oral    levETIRAcetam (Keppra) tab 1,000 mg     Frequency: BID     Dose: 1,000 mg     Route: Oral    methenamine (Hiprex) tab 1 g     Frequency: BID     Dose: 1 g     Route: Oral    metoprolol tartrate (Lopressor) tab 25 mg     Frequency: 2x Daily(Beta Blocker)     Dose: 25 mg     Route: Oral    morphINE PF 2 MG/ML injection 1 mg     Frequency: Q2H PRN     Dose: 1 mg     Route: Intravenous    oxyCODONE immediate release tab 5 mg     Frequency: Q4H PRN     Dose: 5 mg     Route: Oral    pantoprazole (Protonix) DR tab 40 mg     Frequency: QAM AC     Dose: 40 mg     Route: Oral    potassium chloride (K-Dur) tab 40 mEq     Frequency: Q4H     Dose: 40 mEq     Route: Oral    traMADol (Ultram) tab 50 mg     Frequency: Q6H PRN     Dose: 50 mg     Route: Oral       UTI.    Continue ceftriaxone    Seizure disorder.  Continue current treatment.      Discussed with the nursing staff.     Boyd Landry MD
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
## 2024-01-27 NOTE — PHYSICAL THERAPY NOTE
PHYSICAL THERAPY TREATMENT NOTE - INPATIENT     Room Number: 422/422-A       Presenting Problem: fall with left femur fx, s/p IM nail, WBAT    Problem List  Principal Problem:    Closed fracture of neck of left femur, initial encounter ([facility])      PHYSICAL THERAPY ASSESSMENT   Chart reviewed. KATHIA Laura approved participation in physical therapy.  PPE worn by therapist: mask and gloves. Patient was not wearing a mask during session. Patient presented in bed with 5/10 pain. Patient with good  progress towards goals during this session. Education provided on Total Hip precautions, Weight bearing status, Physical therapy plan of care, and physiological benefits of out of bed mobility. Patient with good carryover. Patient sit EOB for ~ 10 min. First c/o dizzy VS stable. Patient stood for short time and has to back sit on bed c/o dizzy and left leg pain. When feels better was transfer with Mod A x 2 from bed to bedside chair SPT, therapist hold left leg for comfort.     Bed mobility: Mod assist  Transfers: Mod assist x 2  Gait Assistance: Not tested  Distance (ft): SPT  Assistive Device: Rolling walker        Patient was left in bedside chair at end of session with all needs in reach. The patient's Approx Degree of Impairment: 68.66% has been calculated based on documentation in the [facility] '6 clicks' Inpatient Basic Mobility Short Form.  Research supports that patients with this level of impairment may benefit from Subacute Rehab.  RN aware of patient status post session.    DISCHARGE RECOMMENDATIONS  PT Discharge Recommendations: Undetermined     PLAN  PT Treatment Plan: Endurance;Body mechanics;Coordination;Gait training;Transfer training;Balance training    SUBJECTIVE  I will try.    OBJECTIVE  Precautions: Limb alert - left    WEIGHT BEARING RESTRICTION  Weight Bearing Restriction: L lower extremity           L Lower Extremity: Weight Bearing as Tolerated    PAIN ASSESSMENT   Ratin  Location: left leg  Management  Techniques: Activity promotion;Body mechanics;Repositioning    BALANCE                                                                                                                       Static Sitting: Good  Dynamic Sitting: Fair +           Static Standing: Poor +  Dynamic Standing: Poor    ACTIVITY TOLERANCE  Pulse: 79  Heart Rate Source: Monitor  Resp: 18  BP: 91/71  BP Location: Right arm  BP Method: Automatic  Patient Position: Sitting    O2 WALK  Oxygen Therapy  SPO2% on Room Air at Rest: 98    AM-PAC '6-Clicks' INPATIENT SHORT FORM - BASIC MOBILITY  How much difficulty does the patient currently have...  Patient Difficulty: Turning over in bed (including adjusting bedclothes, sheets and blankets)?: A Little   Patient Difficulty: Sitting down on and standing up from a chair with arms (e.g., wheelchair, bedside commode, etc.): A Lot   Patient Difficulty: Moving from lying on back to sitting on the side of the bed?: A Lot   How much help from another person does the patient currently need...   Help from Another: Moving to and from a bed to a chair (including a wheelchair)?: A Lot   Help from Another: Need to walk in hospital room?: A Lot   Help from Another: Climbing 3-5 steps with a railing?: Total     AM-PAC Score:  Raw Score: 12   Approx Degree of Impairment: 68.66%   Standardized Score (AM-PAC Scale): 35.33   CMS Modifier (G-Code): CL      Additional information:     THERAPEUTIC EXERCISES  Lower Extremity Ankle pumps  Heel slides  Knee extension  Quad sets     Position Sitting and Supine       Patient End of Session: Up in chair;All patient questions and concerns addressed;RN aware of session/findings;Call light within reach;Needs met    CURRENT GOALS   Goals to be met by: 2/524  Patient Goal Patient's self-stated goal is: to go home    Goal #1 Patient is able to demonstrate supine - sit EOB @ level: minimal assistance   Goal #1   Current Status Mod A   Goal #2 Patient is able to demonstrate transfers Sit  to/from Stand at assistance level: minimal assistance     Goal #2  Current Status Mod A x 2   Goal #3 Patient is able to ambulate 100 feet with assistive device at assistance level: minimal assistance   Goal #3   Current Status SPT with Mod A x 2   Goal #4 Patient will negotiate 4 stairs/one curb w/ assistive device and moderate assistance   Goal #4   Current Status NT   Goal #5 Patient verbalizes and/or demonstrates all precautions and safety concerns independently   Goal #5   Current Status instructed   Goal #6 Patient independently performs home exercise program for ROM/strengthening per the instructions provided in preparation for discharge.   Goal #6  Current Status instructed     Gait Training: 10 minutes  Therapeutic Activity: 20 minutes  Neuromuscular Re-education:  minutes  Therapeutic Exercise:  minutes   Canalith Repositioning:  minutes  Manual Therapy:  minutes  Can add/delete any of these

## 2024-01-28 PROCEDURE — 97530 THERAPEUTIC ACTIVITIES: CPT

## 2024-01-28 RX ORDER — POLYETHYLENE GLYCOL 3350 17 G/17G
17 POWDER, FOR SOLUTION ORAL DAILY
Status: DISCONTINUED | OUTPATIENT
Start: 2024-01-28 | End: 2024-01-29

## 2024-01-28 RX ORDER — DOCUSATE SODIUM 100 MG/1
100 CAPSULE, LIQUID FILLED ORAL 2 TIMES DAILY
Status: DISCONTINUED | OUTPATIENT
Start: 2024-01-28 | End: 2024-01-29

## 2024-01-28 NOTE — PROGRESS NOTES
[facility]    Progress Note    Denisa Blount Patient Status:  Inpatient    3/4/1965 MRN [id number]   Location [facility] 4W/SW/SE Attending Boyd Landry MD   Hosp Day # 4 PCP YAIR BRO       SUBJECTIVE:     No fever no chills.    No chest pain no shortness of breath.   The patient wants to get more therapy.  Patient tells me that she  is going  home tomorrow    OBJECTIVE:  Vital signs in last 24 hours:  /71 (BP Location: Right arm)   Pulse 84   Temp 98.2 °F (36.8 °C) (Oral)   Resp 18   Ht 5' 5\" (1.651 m)   Wt 130 lb (59 kg)   SpO2 99%   BMI 21.63 kg/m²     Intake/Output:    Intake/Output Summary (Last 24 hours) at 2024 0833  Last data filed at 2024 0332  Gross per 24 hour   Intake 360 ml   Output 2080 ml   Net -1720 ml       Wt Readings from Last 3 Encounters:   24 130 lb (59 kg)   20 125 lb (56.7 kg)   20 120 lb (54.4 kg)       Exam  Gen: No acute distress, alert   HEENT:   Pallor noted  Pulm: Lungs clear, normal respiratory effort  CV: Heart with regular rate and rhythm, no peripheral edema  Abd: Abdomen soft, nontender, nondistended, no organomegaly, bowel sounds present    Skin: no rashes or lesions  CNS:   Alert    Data Review:     Labs:   Lab Results   Component Value Date    K 4.0 2024       LABS  Recent Labs   Lab 24  0959 24  0439 24  0750 24  0454   RBC 3.46* 3.12* 2.95* 2.86*   HGB 10.2* 9.0* 8.4* 8.1*   HCT 31.6* 27.7* 26.4* 25.9*   MCV 91.3 88.8 89.5 90.6   MCH 29.5 28.8 28.5 28.3   MCHC 32.3 32.5 31.8 31.3   RDW 14.5 14.7 14.6 14.5   NEPRELIM 5.01 5.02 6.54 4.68   WBC 7.2 7.3 8.7 7.1   .0 260.0 243.0 233.0   AST 35*  --   --   --    ALT 19  --   --   --    GLU 97 109*  --  96       Imaging:      Meds:         Assessment  Patient Active Problem List   Diagnosis    Malunion, fracture    Primary localized osteoarthrosis, lower leg    Derangement of posterior horn of medial meniscus     Chondromalacia of patella    Ankle fracture    Hip fracture (HCC)    Degenerative tear of lateral meniscus of left knee    L knee global 6/14/14    Closed fracture of neck of left femur, initial encounter ([facility])     Active Orders   Nourishments    Room Service Eligibility Until Discontinued     Frequency: Until Discontinued     Number of Occurrences: Until Specified    Room Service Notify RN Until Discontinued     Frequency: Until Discontinued     Number of Occurrences: Until Specified   Diet    Regular/General diet Is Patient on Accuchecks? No; Misc Restriction: Cardiac     Frequency: Effective Now     Number of Occurrences: Until Specified   Nursing    Initiate electrolyte protocol     Frequency: Until Discontinued     Number of Occurrences: Until Specified    Verify informed consent     Frequency: Once     Number of Occurrences: 1 Occurrences    Vital signs     Frequency: Per Unit Routine     Number of Occurrences: 2 Hours     Order Comments: ED holding order only  Cancel if other admission orders exist!       Consult    ED Consult to Orthopedic Surgery     Frequency: Once     Number of Occurrences: 1 Occurrences    ED Consult to Orthopedic Surgery     Frequency: Once     Number of Occurrences: 1 Occurrences    ED Consult to Primary Care/Medicine     Frequency: Once     Number of Occurrences: 1 Occurrences   Isolation    Enteric/Contact PLUS Isolation Continuous     Frequency: Continuous     Number of Occurrences: Until Specified   Medications    acetaminophen (Tylenol) tab 650 mg     Frequency: Q6H PRN     Dose: 650 mg     Route: Oral    apixaban (Eliquis) tab 5 mg     Frequency: BID     Dose: 5 mg     Route: Oral    atorvastatin (Lipitor) tab 20 mg     Frequency: Nightly     Dose: 20 mg     Route: Oral    cefTRIAXone (Rocephin) 1 g in D5W 100 mL IVPB-ADD     Frequency: Q24H     Dose: 1 g     Route: Intravenous    cyclobenzaprine (Flexeril) tab 5 mg     Frequency: BID PRN     Dose: 5 mg     Route: Oral     diphenhydrAMINE (Benadryl) cap/tab 25 mg     Frequency: TID PRN     Dose: 25 mg     Route: Oral    docusate sodium (Colace) cap 100 mg     Frequency: BID     Dose: 100 mg     Route: Oral    levETIRAcetam (Keppra) tab 1,000 mg     Frequency: BID     Dose: 1,000 mg     Route: Oral    methenamine (Hiprex) tab 1 g     Frequency: BID     Dose: 1 g     Route: Oral    metoprolol tartrate (Lopressor) tab 25 mg     Frequency: 2x Daily(Beta Blocker)     Dose: 25 mg     Route: Oral    morphINE PF 2 MG/ML injection 1 mg     Frequency: Q2H PRN     Dose: 1 mg     Route: Intravenous    oxyCODONE immediate release tab 5 mg     Frequency: Q4H PRN     Dose: 5 mg     Route: Oral    pantoprazole (Protonix) DR tab 40 mg     Frequency: QAM AC     Dose: 40 mg     Route: Oral    polyethylene glycol (PEG 3350) (Miralax) 17 g oral packet 17 g     Frequency: Daily     Dose: 17 g     Route: Oral    traMADol (Ultram) tab 50 mg     Frequency: Q6H PRN     Dose: 50 mg     Route: Oral       UTI.    Continue ceftriaxone    Seizure disorder.  Continue current treatment.      Discussed with the nursing staff.     Boyd Landry MD

## 2024-01-28 NOTE — PLAN OF CARE
Problem: Patient Centered Care  Goal: Patient preferences are identified and integrated in the patient's plan of care  Description: Interventions:  - What would you like us to know as we care for you? Patient is from home with her  and he is her support sytem.  - Provide timely, complete, and accurate information to patient/family  - Incorporate patient and family knowledge, values, beliefs, and cultural backgrounds into the planning and delivery of care  - Encourage patient/family to participate in care and decision-making at the level they choose  - Honor patient and family perspectives and choices  Outcome: Progressing     Problem: Patient/Family Goals  Goal: Patient/Family Long Term Goal  Description: Patient's Long Term Goal: to go home from rehab and recover well.    Interventions:  - Up as tolerated with walker and assist, WBAT to left leg.  - Monitor incision for any signs of infection or bleeding.  - Pain management with oral medication.  - May ice incision to prevent swelling or help reduce pain.  - Take oral anticoagulant as ordered to prevent blood clots.  - PT/OT  - See additional Care Plan goals for specific interventions  Outcome: Progressing  Goal: Patient/Family Short Term Goal  Description: Patient's Short Term Goal: to have less pain    Interventions:   - Up as tolerated with walker and assist, WBAT to left leg.  - Monitor incision for any signs of infection or bleeding.  - Pain management with oral medication.  - May ice incision to prevent swelling or help reduce pain.  - SCD's to both legs and administer oral anticoagulant as ordered to prevent blood clots.  - Use incentive spirometry to prevent Pneumonia.  - PT/OT as ordered.  - See additional Care Plan goals for specific interventions  Outcome: Progressing     Problem: PAIN - ADULT  Goal: Verbalizes/displays adequate comfort level or patient's stated pain goal  Description: INTERVENTIONS:  - Encourage pt to monitor pain and request  assistance  - Assess pain using appropriate pain scale  - Administer analgesics based on type and severity of pain and evaluate response  - Implement non-pharmacological measures as appropriate and evaluate response  - Consider cultural and social influences on pain and pain management  - Manage/alleviate anxiety  - Utilize distraction and/or relaxation techniques  - Monitor for opioid side effects  - Notify MD/LIP if interventions unsuccessful or patient reports new pain  - Anticipate increased pain with activity and pre-medicate as appropriate  Outcome: Progressing     Problem: RISK FOR INFECTION - ADULT  Goal: Absence of fever/infection during anticipated neutropenic period  Description: INTERVENTIONS  - Monitor WBC  - Administer growth factors as ordered  - Implement neutropenic guidelines  Outcome: Progressing     Problem: SAFETY ADULT - FALL  Goal: Free from fall injury  Description: INTERVENTIONS:  - Assess pt frequently for physical needs  - Identify cognitive and physical deficits and behaviors that affect risk of falls.  - [place] fall precautions as indicated by assessment.  - Educate pt/family on patient safety including physical limitations  - Instruct pt to call for assistance with activity based on assessment  - Modify environment to reduce risk of injury  - Provide assistive devices as appropriate  - Consider OT/PT consult to assist with strengthening/mobility  - Encourage toileting schedule  Outcome: Progressing     Problem: DISCHARGE PLANNING  Goal: Discharge to home or other facility with appropriate resources  Description: INTERVENTIONS:  - Identify barriers to discharge w/pt and caregiver  - Include patient/family/discharge partner in discharge planning  - Arrange for needed discharge resources and transportation as appropriate  - Identify discharge learning needs (meds, wound care, etc)  - Arrange for interpreters to assist at discharge as needed  - Consider post-discharge preferences of  patient/family/discharge partner  - Complete POLST form as appropriate  - Assess patient's ability to be responsible for managing their own health  - Refer to Case Management Department for coordinating discharge planning if the patient needs post-hospital services based on physician/LIP order or complex needs related to functional status, cognitive ability or social support system  Outcome: Progressing     Problem: MUSCULOSKELETAL - ADULT  Goal: Return mobility to safest level of function  Description: INTERVENTIONS:  - Assess patient stability and activity tolerance for standing, transferring and ambulating w/ or w/o assistive devices  - Assist with transfers and ambulation using safe patient handling equipment as needed  - Ensure adequate protection for wounds/incisions during mobilization  - Obtain PT/OT consults as needed  - Advance activity as appropriate  - Communicate ordered activity level and limitations with patient/family  Outcome: Progressing    Patient is alert and oriented, aware to call for help as needed.  Patient is currently in room air, denies shortness of breathing nor chest pain.  Patient is up with a walker with 1 assist, still just doing a couple a steps at a time from bed to chair and back.  Pain is managed with oral medication.  Patient is voiding thru a purewick.  Patient will be going to a rehab when stable.

## 2024-01-28 NOTE — PHYSICAL THERAPY NOTE
PHYSICAL THERAPY TREATMENT NOTE - INPATIENT     Room Number: 422/422-A       Presenting Problem: fall with left femur fx, s/p IM nail, WBAT    Problem List  Principal Problem:    Closed fracture of neck of left femur, initial encounter ([facility])      PHYSICAL THERAPY ASSESSMENT   Chart reviewed. KATHIA Laura approved participation in physical therapy. PPE worn by therapist: mask and gloves. Patient was not wearing a mask during session. Patient presented in bed with did not rate/10 pain. Patient with fair  progress towards goals during this session. Education provided on Physical therapy plan of care and physiological benefits of out of bed mobility. Patient with good carryover. Pt was received in the bed and was cleared for therapy session. Pt is SBA with bed mobility and to transfer to the EOB. Pt required extra time to perform. Pt sat EOB for a few minutes and denied any dizziness and light headedness. Pt is min A with sit<>stand transfers with the RW. Pt was cued for proper hand placement for safety. Pt was able to AM b about 15' with the RW min A. Pt with slow gait and and decreased step length. Pt with some difficulty picking up her L LE to take steps. Pt then reported that she was having increased dizziness and required to sit EOB. Pt's BP taken and was 110/91 with sitting EOB. Pt reported then that was dizziness was less and was able to AMB about 5' with the RW to the bs chair. Pt is repositioned and left in the chair with all needs within reach. Discussed PT dc recommendation with pt. Pt with good understanding. Pt is not safe at this time to dc to home. Will reassess tomorrow. Reported to the RN on the status of the pt.     Bed mobility: Supervision  Transfers: Min assist  Gait Assistance: Minimum assistance  Distance (ft): 20'  Assistive Device: Rolling walker  Pattern: L Decreased stance time;L Foot drag          . Patient was left in bedside chair at end of session with all needs in reach. The patient's  Approx Degree of Impairment: 54.16% has been calculated based on documentation in the [facility] '6 clicks' Inpatient Basic Mobility Short Form.  Research supports that patients with this level of impairment may benefit from Subacute Rehab. However pt is declining dc to rehab at this time and wants to dc to home.. RN aware of patient status post session.    DISCHARGE RECOMMENDATIONS  PT Discharge Recommendations: Sub-acute rehabilitation     PLAN  PT Treatment Plan: Bed mobility;Body mechanics;Coordination;Endurance;Patient education;Gait training;Strengthening;Stoop training;Stair training;Transfer training;Balance training    SUBJECTIVE  Pt was agreeable to therapy session.         OBJECTIVE  Precautions: Limb alert - left    WEIGHT BEARING RESTRICTION  Weight Bearing Restriction: L lower extremity           L Lower Extremity: Weight Bearing as Tolerated    PAIN ASSESSMENT   Rating:  (did not rate)  Location: L LE  Management Techniques: Activity promotion;Body mechanics;Relaxation;Repositioning    BALANCE                                                                                                                       Static Sitting: Fair +  Dynamic Sitting: Fair           Static Standing: Fair -  Dynamic Standing: Poor +    ACTIVITY TOLERANCE           BP: (!) 110/91  BP Location: Right arm  BP Method: Automatic  Patient Position: Sitting    O2 WALK       AM-PAC '6-Clicks' INPATIENT SHORT FORM - BASIC MOBILITY  How much difficulty does the patient currently have...  Patient Difficulty: Turning over in bed (including adjusting bedclothes, sheets and blankets)?: A Little   Patient Difficulty: Sitting down on and standing up from a chair with arms (e.g., wheelchair, bedside commode, etc.): A Little   Patient Difficulty: Moving from lying on back to sitting on the side of the bed?: A Little   How much help from another person does the patient currently need...   Help from Another: Moving to and from a bed to a chair  (including a wheelchair)?: A Little   Help from Another: Need to walk in hospital room?: A Little   Help from Another: Climbing 3-5 steps with a railing?: Total     AM-PAC Score:  Raw Score: 16   Approx Degree of Impairment: 54.16%   Standardized Score (AM-PAC Scale): 40.78   CMS Modifier (G-Code): CK          Patient End of Session: Up in chair;Needs met;Call light within reach;RN aware of session/findings;All patient questions and concerns addressed    CURRENT GOALS   Goals to be met by: 2/5/24  Patient Goal Patient's self-stated goal is: to go home    Goal #1 Patient is able to demonstrate supine - sit EOB @ level: minimal assistance   Goal #1   Current Status SBA    Goal #2 Patient is able to demonstrate transfers Sit to/from Stand at assistance level: minimal assistance     Goal #2  Current Status Min A with the RW   Goal #3 Patient is able to ambulate 100 feet with assistive device at assistance level: minimal assistance   Goal #3   Current Status 20' with the RW min A   Goal #4 Patient will negotiate 4 stairs/one curb w/ assistive device and moderate assistance   Goal #4   Current Status NT   Goal #5 Patient verbalizes and/or demonstrates all precautions and safety concerns independently   Goal #5   Current Status iIN PROGRESS   Goal #6 Patient independently performs home exercise program for ROM/strengthening per the instructions provided in preparation for discharge.   Goal #6  Current Status IN PROGRESS           Therapeutic Activity: 30 minutes

## 2024-01-28 NOTE — PLAN OF CARE
Problem: Patient Centered Care  Goal: Patient preferences are identified and integrated in the patient's plan of care  Description: Interventions:  - What would you like us to know as we care for you? Patient is from home with her  and he is her support sytem.  - Provide timely, complete, and accurate information to patient/family  - Incorporate patient and family knowledge, values, beliefs, and cultural backgrounds into the planning and delivery of care  - Encourage patient/family to participate in care and decision-making at the level they choose  - Honor patient and family perspectives and choices  Outcome: Progressing     Problem: Patient/Family Goals  Goal: Patient/Family Long Term Goal  Description: Patient's Long Term Goal: to go home from rehab and recover well.    Interventions:  - Up as tolerated with walker and assist, WBAT to left leg.  - Monitor incision for any signs of infection or bleeding.  - Pain management with oral medication.  - May ice incision to prevent swelling or help reduce pain.  - Take oral anticoagulant as ordered to prevent blood clots.  - PT/OT  - See additional Care Plan goals for specific interventions  Outcome: Progressing  Goal: Patient/Family Short Term Goal  Description: Patient's Short Term Goal: to have less pain    Interventions:   - Up as tolerated with walker and assist, WBAT to left leg.  - Monitor incision for any signs of infection or bleeding.  - Pain management with oral medication.  - May ice incision to prevent swelling or help reduce pain.  - SCD's to both legs and administer oral anticoagulant as ordered to prevent blood clots.  - Use incentive spirometry to prevent Pneumonia.  - PT/OT as ordered.  - See additional Care Plan goals for specific interventions  Outcome: Progressing     Problem: PAIN - ADULT  Goal: Verbalizes/displays adequate comfort level or patient's stated pain goal  Description: INTERVENTIONS:  - Encourage pt to monitor pain and request  assistance  - Assess pain using appropriate pain scale  - Administer analgesics based on type and severity of pain and evaluate response  - Implement non-pharmacological measures as appropriate and evaluate response  - Consider cultural and social influences on pain and pain management  - Manage/alleviate anxiety  - Utilize distraction and/or relaxation techniques  - Monitor for opioid side effects  - Notify MD/LIP if interventions unsuccessful or patient reports new pain  - Anticipate increased pain with activity and pre-medicate as appropriate  Outcome: Progressing     Problem: RISK FOR INFECTION - ADULT  Goal: Absence of fever/infection during anticipated neutropenic period  Description: INTERVENTIONS  - Monitor WBC  - Administer growth factors as ordered  - Implement neutropenic guidelines  Outcome: Progressing     Problem: SAFETY ADULT - FALL  Goal: Free from fall injury  Description: INTERVENTIONS:  - Assess pt frequently for physical needs  - Identify cognitive and physical deficits and behaviors that affect risk of falls.  - [place] fall precautions as indicated by assessment.  - Educate pt/family on patient safety including physical limitations  - Instruct pt to call for assistance with activity based on assessment  - Modify environment to reduce risk of injury  - Provide assistive devices as appropriate  - Consider OT/PT consult to assist with strengthening/mobility  - Encourage toileting schedule  Outcome: Progressing     Problem: DISCHARGE PLANNING  Goal: Discharge to home or other facility with appropriate resources  Description: INTERVENTIONS:  - Identify barriers to discharge w/pt and caregiver  - Include patient/family/discharge partner in discharge planning  - Arrange for needed discharge resources and transportation as appropriate  - Identify discharge learning needs (meds, wound care, etc)  - Arrange for interpreters to assist at discharge as needed  - Consider post-discharge preferences of  patient/family/discharge partner  - Complete POLST form as appropriate  - Assess patient's ability to be responsible for managing their own health  - Refer to Case Management Department for coordinating discharge planning if the patient needs post-hospital services based on physician/LIP order or complex needs related to functional status, cognitive ability or social support system  Outcome: Progressing     Problem: MUSCULOSKELETAL - ADULT  Goal: Return mobility to safest level of function  Description: INTERVENTIONS:  - Assess patient stability and activity tolerance for standing, transferring and ambulating w/ or w/o assistive devices  - Assist with transfers and ambulation using safe patient handling equipment as needed  - Ensure adequate protection for wounds/incisions during mobilization  - Obtain PT/OT consults as needed  - Advance activity as appropriate  - Communicate ordered activity level and limitations with patient/family  Outcome: Progressing     Patient is alert and oriented, aware to call for help as needed. Patient is currently in room air, denies shortness of breathing nor chest pain.  Patient is up with a walker with 1 assist.  Patient is voiding thru a purewick suction catheter and passing gas but denies having a BM.  Patient takes oral meds for pain.  Patient will either be going home with [facility] if passes PT or [facility].

## 2024-01-29 VITALS
BODY MASS INDEX: 21.66 KG/M2 | RESPIRATION RATE: 17 BRPM | HEART RATE: 99 BPM | HEIGHT: 65 IN | WEIGHT: 130 LBS | TEMPERATURE: 98 F | SYSTOLIC BLOOD PRESSURE: 95 MMHG | OXYGEN SATURATION: 95 % | DIASTOLIC BLOOD PRESSURE: 71 MMHG

## 2024-01-29 PROCEDURE — 97530 THERAPEUTIC ACTIVITIES: CPT

## 2024-01-29 RX ORDER — NALOXONE HYDROCHLORIDE 4 MG/.1ML
4 SPRAY NASAL AS NEEDED
Qty: 1 KIT | Refills: 0 | Status: SHIPPED | OUTPATIENT
Start: 2024-01-29

## 2024-01-29 RX ORDER — CYCLOBENZAPRINE HCL 5 MG
5 TABLET ORAL 2 TIMES DAILY PRN
Qty: 8 TABLET | Refills: 0 | Status: SHIPPED | OUTPATIENT
Start: 2024-01-29

## 2024-01-29 RX ORDER — OXYCODONE HYDROCHLORIDE 5 MG/1
5 TABLET ORAL EVERY 4 HOURS PRN
Qty: 12 TABLET | Refills: 0 | Status: SHIPPED | OUTPATIENT
Start: 2024-01-29

## 2024-01-29 NOTE — DISCHARGE PLANNING
[facility]    Discharge Summary    Denisa Blount Patient Status:  Inpatient    3/4/1965 MRN [id number]   Location [facility] 4W/SW/SE Attending Boyd Landry MD   Hosp Day # 5 PCP YAIR BRO                Date of Admission: 2024   Date of Discharge: 2024    Admitting Diagnosis: Closed fracture of neck of left femur, initial encounter ([facility]) [S72.002A]  UTI.  Hypertension.  Paroxysmal atrial fibrillation      Disposition: Home and Home with Home Health Care    Discharge Diagnosis: .Principal Problem:    Closed fracture of neck of left femur, initial encounter ([facility])      Hospital Course:   Reason for Admission: Acute left hip fracture    Discharge Physical Exam:  Vital Signs:  Blood pressure 95/71, pulse 99, temperature 97.5 °F (36.4 °C), temperature source Oral, resp. rate 17, height 5' 5\" (1.651 m), weight 130 lb (59 kg), SpO2 95%.  Per nursing staff  General: No acute distress. Alert and oriented x 3.  HEENT: Moist mucous membranes. EOM-I. PERRL  Neck: No lymphadenopathy.  No JVD. No carotid bruits.  Respiratory: Clear to auscultation bilaterally.  No wheezes. No rhonchi.  Cardiovascular: S1, S2.  Regular rate and rhythm.  No murmurs. Equal pulses   Abdomen: Soft, nontender, nondistended.  Positive bowel sounds. No rebound tenderness  Neurologic: No focal neurological deficits.  Musculoskeletal: Full range of motion of all extremities.  No swelling noted.  Integument: No lesions. No erythema.  Psychiatric: Appropriate mood and affect.    Hospital Course:   Denisa Blount is a(n) 58 year old female.   With the seizure disorder well controlled, atrial fibrillation, history of the right leg the surgery in the past who was in her usual state of health.  Patient woke up at night around 1:00 a.m. and she was going to the bathroom and she fell down.  Patient the denies any dizziness or any chest pain or any palpitations prior to the fall.  Patient was complaining of severe pain in  the left hip.  And the paramedics were called and she was brought to the emergency room.  A family member heard the fall.  Patient also feels that she might have lost consciousness.    There is no active seizure activity reported.    Patient currently denies any chest pain or any shortness of breath.    Patient was admitted hospital show seen by orthopedics patient underwent open reduction internal fixation of the left hip fracture.  Patient also treated for UTI.  Patient home medications were continued.  Today the patient is doing fine.  She wants to be discharged home.  She does not go to subacute rehab.  Patient was upset and angry that she was not discharged earlier.  I explained to her that discharge is a process which is to be coordinated by different specialties.   also trying to arrange home health care and home health care agency for her.  Nursing staff also reported to me that patient was refusing vital signs at noon.  She just wants to be discharged within 5 minutes.    Complications:     Consultants         Provider   Role Specialty     Danni Edouard DO  Consulting Physician SURGERY, ORTHOPEDIC     Boyd Landry MD  Consulting Physician Internal Medicine     Solomon Waite MD  Consulting Physician SURGERY, ORTHOPEDIC          Surgical Procedures       Case IDs Date Procedure Surgeon Location Status    [id number] 1/25/24 LEFT HIP OPEN REDUCTION INTERNAL FIXATION USING A CEPHALLOMEDULLARY NAIL Narinder Ewing MD [facility] MAIN OR [facility]          Pending Labs       Order Current Status    Blood Culture Preliminary result    Blood Culture Preliminary result            Discharge Plan:   Discharge Condition: Good    Current Discharge Medication List        New Orders    Details   cyclobenzaprine 5 MG Oral Tab Take 1 tablet (5 mg total) by mouth 2 (two) times daily as needed for Muscle spasms.      oxyCODONE 5 MG Oral Tab Take 1 tablet (5 mg total) by mouth every 4 (four) hours as needed for Pain.       Naloxone HCl 4 MG/0.1ML Nasal Liquid 4 mg by Nasal route as needed. If patient remains unresponsive, repeat dose in other nostril 2-5 minutes after first dose.           Home Meds - Unchanged    Details   metoprolol tartrate 25 MG Oral Tab Take 1 tablet (25 mg total) by mouth 2 (two) times daily.      levETIRAcetam 100 MG/ML Oral Solution Take 10 mg/kg by mouth 2 (two) times daily.      methenamine 1 g Oral Tab Take 1 tablet (1 g total) by mouth 2 (two) times daily.      omeprazole 20 MG Oral Capsule Delayed Release Take 2 capsules (40 mg total) by mouth every morning before breakfast.      atorvastatin 20 MG Oral Tab Take 1 tablet (20 mg total) by mouth nightly.      apixaban 5 MG Oral Tab Take 1 tablet (5 mg total) by mouth 2 (two) times daily.                 Discharge Diet: Cardiac diet      Discharge Activity: As tolerated    Follow up:      Follow-up Information       Narinder Ewing MD. Schedule an appointment as soon as possible for a visit in 2 week(s).    Specialties: Surgery, Orthopaedic, SURGERY, ORTHOPEDIC  Contact information:  [address] [place] [address] [phone]               Louis King Schedule an appointment as soon as possible for a visit in 1 week(s).    Specialty: Internal Medicine  Contact information:  [address] [facility] [address]  [facility] [address] [phone]                             Follow up Labs:          Other Discharge Instructions:         [facility]   [address], [address]   [place] [address]   Phone: [phone]   Fax: [phone]  Please call MD or come to emergency room for any fever chills increased pain in the leg or any new symptoms      Boyd Lanrdy MD   1/29/2024

## 2024-01-29 NOTE — CM/SW NOTE
MARKO followed up on DC planning.     MARKO received note from RN stating pt is ready for DC home with [facility] today as she is refusing [facility].    Referrals sent in aidin    SW able to confirm pt's insurance through Jono Blount and updated Registration with info as it was confirmed with [employer].     Insurance info uploaded in aidin for [facility] to review    UPDATE:     Pt wants to discharge home with [facility],  [facility] was the first agency who accepted and confirmed they can see pt in the home and works with her insurance    [facility], [address], [address]   [place] [address]   Phone: [phone]   Fax: [phone]    PLAN: DC Home with [facility]    ONEAL Diez, MSW ext. [phone]

## 2024-01-29 NOTE — PROGRESS NOTES
[facility]    Progress Note    Denisa Blount Patient Status:  Inpatient    3/4/1965 MRN [id number]   Location [facility] 4W/SW/SE Attending Boyd Landry MD   Hosp Day # 5 PCP YAIR BRO        Subjective:   Denisa Blount is a(n) 58 year old female now  s/p L hip CMN for GT fx with extension in to the IT region on MRI. She has been doing well. She wishes she could spend more time with PT. She says that she is moving well and wants to go home today. She has not had f/c/n/v      Objective:   Vital Signs:  Blood pressure 110/65, pulse 90, temperature 98.7 °F (37.1 °C), temperature source Oral, resp. rate 18, height 5' 5\" (1.651 m), weight 130 lb (59 kg), SpO2 95%.     Physical Exam:   General: Alert, orientated x3.  Cooperative.  No apparent distress.  Left leg  Dressings CDI, no erythema, no fluctuance  Thigh is soft and compressible  SILT s/s/sp/dp/t  EHL/FHL/GS/TA intact  2+ DP pulse  No pain with logroll  Leg strength improving          Assessment and Plan:    59yo F with prior R hip fracture and afib on eliquis with a left GT fx with extension into the intertrochanteric region on MRI now s/p L hip CMN (24) for the IT extension. She has been working with PT. She is asking to be discharged to home and states she has help at home. PT recommending [facility] but patient expressing desire to go home with home health PT    WBAT  PT to mobilize, continues to progress  DVT ppx  Suture removal at 2 weeks  SW and CM for discharge planning, please assist with home health PT coordination      Follow up in 2 weeks with Dr. Narinder Ewing at [facility] ([phone])

## 2024-01-29 NOTE — PLAN OF CARE
Patient Aox4. Room air. General diet, tolerated well. Pt refused SCDs during stay. Eliquis for dvt prophylaxis. PRN benadryl given. PRN oxy given for pain management. Pt refused afternoon VS, per pt \"Just want to go home\". Pt discharging with [facility] despite recommendations from PT. Discharging home. Discharge instructions given at bedside. Cleared for discharge by Mds.     Problem: Patient Centered Care  Goal: Patient preferences are identified and integrated in the patient's plan of care  Description: Interventions:  - What would you like us to know as we care for you? Patient is from home with her  and he is her support sytem.  - Provide timely, complete, and accurate information to patient/family  - Incorporate patient and family knowledge, values, beliefs, and cultural backgrounds into the planning and delivery of care  - Encourage patient/family to participate in care and decision-making at the level they choose  - Honor patient and family perspectives and choices  Outcome: Adequate for Discharge     Problem: Patient/Family Goals  Goal: Patient/Family Long Term Goal  Description: Patient's Long Term Goal: to go home from rehab and recover well.    Interventions:  - Up as tolerated with walker and assist, WBAT to left leg.  - Monitor incision for any signs of infection or bleeding.  - Pain management with oral medication.  - May ice incision to prevent swelling or help reduce pain.  - Take oral anticoagulant as ordered to prevent blood clots.  - PT/OT  - See additional Care Plan goals for specific interventions  Outcome: Adequate for Discharge  Goal: Patient/Family Short Term Goal  Description: Patient's Short Term Goal: to have less pain    Interventions:   - Up as tolerated with walker and assist, WBAT to left leg.  - Monitor incision for any signs of infection or bleeding.  - Pain management with oral medication.  - May ice incision to prevent swelling or help reduce pain.  - SCD's to both legs and  administer oral anticoagulant as ordered to prevent blood clots.  - Use incentive spirometry to prevent Pneumonia.  - PT/OT as ordered.  - See additional Care Plan goals for specific interventions  Outcome: Adequate for Discharge     Problem: PAIN - ADULT  Goal: Verbalizes/displays adequate comfort level or patient's stated pain goal  Description: INTERVENTIONS:  - Encourage pt to monitor pain and request assistance  - Assess pain using appropriate pain scale  - Administer analgesics based on type and severity of pain and evaluate response  - Implement non-pharmacological measures as appropriate and evaluate response  - Consider cultural and social influences on pain and pain management  - Manage/alleviate anxiety  - Utilize distraction and/or relaxation techniques  - Monitor for opioid side effects  - Notify MD/LIP if interventions unsuccessful or patient reports new pain  - Anticipate increased pain with activity and pre-medicate as appropriate  Outcome: Adequate for Discharge     Problem: RISK FOR INFECTION - ADULT  Goal: Absence of fever/infection during anticipated neutropenic period  Description: INTERVENTIONS  - Monitor WBC  - Administer growth factors as ordered  - Implement neutropenic guidelines  Outcome: Adequate for Discharge     Problem: SAFETY ADULT - FALL  Goal: Free from fall injury  Description: INTERVENTIONS:  - Assess pt frequently for physical needs  - Identify cognitive and physical deficits and behaviors that affect risk of falls.  - [place] fall precautions as indicated by assessment.  - Educate pt/family on patient safety including physical limitations  - Instruct pt to call for assistance with activity based on assessment  - Modify environment to reduce risk of injury  - Provide assistive devices as appropriate  - Consider OT/PT consult to assist with strengthening/mobility  - Encourage toileting schedule  Outcome: Adequate for Discharge     Problem: DISCHARGE PLANNING  Goal: Discharge to  home or other facility with appropriate resources  Description: INTERVENTIONS:  - Identify barriers to discharge w/pt and caregiver  - Include patient/family/discharge partner in discharge planning  - Arrange for needed discharge resources and transportation as appropriate  - Identify discharge learning needs (meds, wound care, etc)  - Arrange for interpreters to assist at discharge as needed  - Consider post-discharge preferences of patient/family/discharge partner  - Complete POLST form as appropriate  - Assess patient's ability to be responsible for managing their own health  - Refer to Case Management Department for coordinating discharge planning if the patient needs post-hospital services based on physician/LIP order or complex needs related to functional status, cognitive ability or social support system  Outcome: Adequate for Discharge     Problem: MUSCULOSKELETAL - ADULT  Goal: Return mobility to safest level of function  Description: INTERVENTIONS:  - Assess patient stability and activity tolerance for standing, transferring and ambulating w/ or w/o assistive devices  - Assist with transfers and ambulation using safe patient handling equipment as needed  - Ensure adequate protection for wounds/incisions during mobilization  - Obtain PT/OT consults as needed  - Advance activity as appropriate  - Communicate ordered activity level and limitations with patient/family  Outcome: Adequate for Discharge

## 2024-01-29 NOTE — PROGRESS NOTES
[facility]    Progress Note    Denisa OWUSU Jose Alejandro Patient Status:  Inpatient    3/4/1965 MRN [id number]   Location [facility] 4W/SW/SE Attending Boyd Landry MD   Hosp Day # 5 PCP YAIR BRO       SUBJECTIVE:  Feeling fine.  Wants to leave immediately.  Patient tells me that her  is waiting in the parking lot.  And she has been sitting waiting to be discharged.  Denies any chest pain denies any dizziness no other complaints.  Nursing staff reported to me that patient refused vital signs at noon.     OBJECTIVE:  Vital signs in last 24 hours:  BP 95/71 (BP Location: Right arm)   Pulse 99   Temp 97.5 °F (36.4 °C) (Oral)   Resp 17   Ht 5' 5\" (1.651 m)   Wt 130 lb (59 kg)   SpO2 95%   BMI 21.63 kg/m²     Intake/Output:    Intake/Output Summary (Last 24 hours) at 2024 1401  Last data filed at 2024 0530  Gross per 24 hour   Intake 720 ml   Output 1700 ml   Net -980 ml       Wt Readings from Last 3 Encounters:   24 130 lb (59 kg)   20 125 lb (56.7 kg)   20 120 lb (54.4 kg)       Exam  Per nursing staff  Gen: No acute distress, alert   HEENT:   Pallor noted  Pulm: Lungs clear, normal respiratory effort  CV: Heart with regular rate and rhythm, no peripheral edema  Abd: Abdomen soft, nontender, nondistended, no organomegaly, bowel sounds present    CNS:   Alert    Data Review:     Labs:          LABS  Recent Labs   Lab 24  0959 24  0439 24  0750 24  0454   RBC 3.46* 3.12* 2.95* 2.86*   HGB 10.2* 9.0* 8.4* 8.1*   HCT 31.6* 27.7* 26.4* 25.9*   MCV 91.3 88.8 89.5 90.6   MCH 29.5 28.8 28.5 28.3   MCHC 32.3 32.5 31.8 31.3   RDW 14.5 14.7 14.6 14.5   NEPRELIM 5.01 5.02 6.54 4.68   WBC 7.2 7.3 8.7 7.1   .0 260.0 243.0 233.0   AST 35*  --   --   --    ALT 19  --   --   --    GLU 97 109*  --  96       Imaging:      Meds:         Assessment  Patient Active Problem List   Diagnosis    Malunion, fracture    Primary localized  osteoarthrosis, lower leg    Derangement of posterior horn of medial meniscus    Chondromalacia of patella    Ankle fracture    Hip fracture ([facility])    Degenerative tear of lateral meniscus of left knee    L knee global 6/14/14    Closed fracture of neck of left femur, initial encounter ([facility])     Active Orders   Nourishments    Room Service Eligibility Until Discontinued     Frequency: Until Discontinued     Number of Occurrences: Until Specified    Room Service Notify RN Until Discontinued     Frequency: Until Discontinued     Number of Occurrences: Until Specified   Discharge    Discharge patient     Frequency: Once     Number of Occurrences: 1 Occurrences   Diet    Regular/General diet Is Patient on Accuchecks? No; Misc Restriction: Cardiac     Frequency: Effective Now     Number of Occurrences: Until Specified   Nursing    Change dressing     Frequency: Q Shift     Number of Occurrences: Until Specified     Order Comments: Dressing can stay for 7 days.      Face to Face Home Care Cert order placed     Frequency: Once     Number of Occurrences: 1 Occurrences    Initiate electrolyte protocol     Frequency: Until Discontinued     Number of Occurrences: Until Specified    Verify informed consent     Frequency: Once     Number of Occurrences: 1 Occurrences    Vital signs     Frequency: Per Unit Routine     Number of Occurrences: 2 Hours     Order Comments: ED holding order only  Cancel if other admission orders exist!       Consult    ED Consult to Orthopedic Surgery     Frequency: Once     Number of Occurrences: 1 Occurrences    ED Consult to Orthopedic Surgery     Frequency: Once     Number of Occurrences: 1 Occurrences    ED Consult to Primary Care/Medicine     Frequency: Once     Number of Occurrences: 1 Occurrences   Medications    acetaminophen (Tylenol) tab 650 mg     Frequency: Q6H PRN     Dose: 650 mg     Route: Oral    apixaban (Eliquis) tab 5 mg     Frequency: BID     Dose: 5 mg     Route: Oral     atorvastatin (Lipitor) tab 20 mg     Frequency: Nightly     Dose: 20 mg     Route: Oral    cyclobenzaprine (Flexeril) tab 5 mg     Frequency: BID PRN     Dose: 5 mg     Route: Oral    diphenhydrAMINE (Benadryl) cap/tab 25 mg     Frequency: TID PRN     Dose: 25 mg     Route: Oral    docusate sodium (Colace) cap 100 mg     Frequency: BID     Dose: 100 mg     Route: Oral    levETIRAcetam (Keppra) tab 1,000 mg     Frequency: BID     Dose: 1,000 mg     Route: Oral    methenamine (Hiprex) tab 1 g     Frequency: BID     Dose: 1 g     Route: Oral    metoprolol tartrate (Lopressor) tab 25 mg     Frequency: 2x Daily(Beta Blocker)     Dose: 25 mg     Route: Oral    morphINE PF 2 MG/ML injection 1 mg     Frequency: Q2H PRN     Dose: 1 mg     Route: Intravenous    oxyCODONE immediate release tab 5 mg     Frequency: Q4H PRN     Dose: 5 mg     Route: Oral    pantoprazole (Protonix) DR tab 40 mg     Frequency: QAM AC     Dose: 40 mg     Route: Oral    polyethylene glycol (PEG 3350) (Miralax) 17 g oral packet 17 g     Frequency: Daily     Dose: 17 g     Route: Oral    traMADol (Ultram) tab 50 mg     Frequency: Q6H PRN     Dose: 50 mg     Route: Oral       UTI.    Completed antibiotic therapy    Seizure disorder.  Continue current treatment.      Patient is status post open reduction and internal fixation of the hip fracture.  Patient has been cleared for discharge by orthopedic.  Patient does not want to go to any subacute rehab.  She wants to go to home with home health care.  is being arranged by .  Discussed with the patient regarding the process of discharge.    Discussed with the nursing staff.   Discharge instructions and follow-up instructions given to the patient    Boyd Landry MD

## 2024-01-29 NOTE — DISCHARGE INSTRUCTIONS
[facility]   [address], [address]   [place] [address]   Phone: [phone]   Fax: [phone]  Please call MD or come to emergency room for any fever chills increased pain in the leg or any new symptoms

## 2024-01-29 NOTE — PLAN OF CARE
Patient has safety precautions in place bed in the lowest position, bed alarm on, and call light within reach. Plan of care ongoing. No further concerns as of present.    Problem: Patient Centered Care  Goal: Patient preferences are identified and integrated in the patient's plan of care  Description: Interventions:  - What would you like us to know as we care for you? Patient is from home with her  and he is her support sytem.  - Provide timely, complete, and accurate information to patient/family  - Incorporate patient and family knowledge, values, beliefs, and cultural backgrounds into the planning and delivery of care  - Encourage patient/family to participate in care and decision-making at the level they choose  - Honor patient and family perspectives and choices  Outcome: Progressing     Problem: Patient/Family Goals  Goal: Patient/Family Long Term Goal  Description: Patient's Long Term Goal: to go home from rehab and recover well.    Interventions:  - Up as tolerated with walker and assist, WBAT to left leg.  - Monitor incision for any signs of infection or bleeding.  - Pain management with oral medication.  - May ice incision to prevent swelling or help reduce pain.  - Take oral anticoagulant as ordered to prevent blood clots.  - PT/OT  - See additional Care Plan goals for specific interventions  Outcome: Progressing  Goal: Patient/Family Short Term Goal  Description: Patient's Short Term Goal: to have less pain    Interventions:   - Up as tolerated with walker and assist, WBAT to left leg.  - Monitor incision for any signs of infection or bleeding.  - Pain management with oral medication.  - May ice incision to prevent swelling or help reduce pain.  - SCD's to both legs and administer oral anticoagulant as ordered to prevent blood clots.  - Use incentive spirometry to prevent Pneumonia.  - PT/OT as ordered.  - See additional Care Plan goals for specific interventions  Outcome: Progressing      Problem: PAIN - ADULT  Goal: Verbalizes/displays adequate comfort level or patient's stated pain goal  Description: INTERVENTIONS:  - Encourage pt to monitor pain and request assistance  - Assess pain using appropriate pain scale  - Administer analgesics based on type and severity of pain and evaluate response  - Implement non-pharmacological measures as appropriate and evaluate response  - Consider cultural and social influences on pain and pain management  - Manage/alleviate anxiety  - Utilize distraction and/or relaxation techniques  - Monitor for opioid side effects  - Notify MD/LIP if interventions unsuccessful or patient reports new pain  - Anticipate increased pain with activity and pre-medicate as appropriate  Outcome: Progressing     Problem: RISK FOR INFECTION - ADULT  Goal: Absence of fever/infection during anticipated neutropenic period  Description: INTERVENTIONS  - Monitor WBC  - Administer growth factors as ordered  - Implement neutropenic guidelines  Outcome: Progressing     Problem: SAFETY ADULT - FALL  Goal: Free from fall injury  Description: INTERVENTIONS:  - Assess pt frequently for physical needs  - Identify cognitive and physical deficits and behaviors that affect risk of falls.  - [place] fall precautions as indicated by assessment.  - Educate pt/family on patient safety including physical limitations  - Instruct pt to call for assistance with activity based on assessment  - Modify environment to reduce risk of injury  - Provide assistive devices as appropriate  - Consider OT/PT consult to assist with strengthening/mobility  - Encourage toileting schedule  Outcome: Progressing     Problem: DISCHARGE PLANNING  Goal: Discharge to home or other facility with appropriate resources  Description: INTERVENTIONS:  - Identify barriers to discharge w/pt and caregiver  - Include patient/family/discharge partner in discharge planning  - Arrange for needed discharge resources and transportation as  appropriate  - Identify discharge learning needs (meds, wound care, etc)  - Arrange for interpreters to assist at discharge as needed  - Consider post-discharge preferences of patient/family/discharge partner  - Complete POLST form as appropriate  - Assess patient's ability to be responsible for managing their own health  - Refer to Case Management Department for coordinating discharge planning if the patient needs post-hospital services based on physician/LIP order or complex needs related to functional status, cognitive ability or social support system  Outcome: Progressing     Problem: MUSCULOSKELETAL - ADULT  Goal: Return mobility to safest level of function  Description: INTERVENTIONS:  - Assess patient stability and activity tolerance for standing, transferring and ambulating w/ or w/o assistive devices  - Assist with transfers and ambulation using safe patient handling equipment as needed  - Ensure adequate protection for wounds/incisions during mobilization  - Obtain PT/OT consults as needed  - Advance activity as appropriate  - Communicate ordered activity level and limitations with patient/family  Outcome: Progressing

## 2024-01-29 NOTE — PHYSICAL THERAPY NOTE
PHYSICAL THERAPY TREATMENT NOTE - INPATIENT     Room Number: 422/422-A       Presenting Problem: fall with left femur fx, s/p IM nail, WBAT    Problem List  Principal Problem:    Closed fracture of neck of left femur, initial encounter ([facility])      PHYSICAL THERAPY ASSESSMENT   Chart reviewed. KATHIA Covarrubias approved participation in physical therapy. PPE worn by therapist: mask and gloves. Patient was not wearing a mask during session. Patient presented in bed with did not rate pain. Patient with good  progress towards goals during this session. Education provided on Physical therapy plan of care, physiological benefits of out of bed mobility, and stair negotiation . Patient with good carryover. Pt is received in the bed and was cleared for therapy session. Pt reported that she was feeling better today. Pt with improved mobility during the session. Pt is SBA with bed mobility and to transfer to the EOB. Pt sat EOB for a few minutes and denied any dizziness and light headedness. Pt is SBA with sit<>stand transfers with the RW. Pt was able to AMB about 20'x2 with the RW SBA. Pt with slow gait and decreased step length with narrow JEROD but with good balance and with improved WB on her L LE while taking steps. Pt was brought to the therapy gym for stair training. Pt was educated and able to perform 4 stairs with 1 HR with CGA but pt then was able to scoot on her bottom down the stairs with CGA. Pt cued for proper sequencing and technique. Returned pt back to the room and to the bed. Pt is min A with her L LE back to supine in the bed. Pt is left in the bed with all needs within reach. Pt is on track to dc to home once medically cleared. Reported to the RN on the status of the pt.     Bed mobility: Supervision and Min assist  Transfers: Supervision  Gait Assistance: Supervision  Distance (ft): 20'x2  Assistive Device: Rolling walker  Pattern: L Decreased stance time          . Patient was left in bed at end of session with  all needs in reach. The patient's Approx Degree of Impairment: 46.58% has been calculated based on documentation in the [facility] '6 clicks' Inpatient Basic Mobility Short Form.  Research supports that patients with this level of impairment may benefit from Home with home health PT. Discussed with the pt and is refusing KACEY but does have the physical skills to return home.at this time . RN aware of patient status post session.    DISCHARGE RECOMMENDATIONS  PT Discharge Recommendations: Home with home health PT;24 hour care/supervision     PLAN  PT Treatment Plan: Bed mobility;Body mechanics;Coordination;Endurance;Patient education;Gait training;Strengthening;Stoop training;Stair training;Transfer training;Balance training    SUBJECTIVE  Pt was agreeable to therapy session.         OBJECTIVE  Precautions: Limb alert - left    WEIGHT BEARING RESTRICTION  Weight Bearing Restriction: L lower extremity           L Lower Extremity: Weight Bearing as Tolerated    PAIN ASSESSMENT   Rating:  (did not rate)  Location: L LE  Management Techniques: Activity promotion;Body mechanics;Relaxation;Repositioning    BALANCE                                                                                                                       Static Sitting: Good  Dynamic Sitting: Fair +           Static Standing: Fair -  Dynamic Standing: Fair -    ACTIVITY TOLERANCE                         O2 WALK       AM-PAC '6-Clicks' INPATIENT SHORT FORM - BASIC MOBILITY  How much difficulty does the patient currently have...  Patient Difficulty: Turning over in bed (including adjusting bedclothes, sheets and blankets)?: A Little   Patient Difficulty: Sitting down on and standing up from a chair with arms (e.g., wheelchair, bedside commode, etc.): A Little   Patient Difficulty: Moving from lying on back to sitting on the side of the bed?: A Little   How much help from another person does the patient currently need...   Help from Another: Moving to and  from a bed to a chair (including a wheelchair)?: A Little   Help from Another: Need to walk in hospital room?: A Little   Help from Another: Climbing 3-5 steps with a railing?: A Little     AM-PAC Score:  Raw Score: 18   Approx Degree of Impairment: 46.58%   Standardized Score (AM-PAC Scale): 43.63   CMS Modifier (G-Code): CK        Patient End of Session: In bed;Needs met;Call light within reach;RN aware of session/findings;All patient questions and concerns addressed    CURRENT GOALS   Goals to be met by: 2/5/24  Patient Goal Patient's self-stated goal is: to go home    Goal #1 Patient is able to demonstrate supine - sit EOB @ level: minimal assistance   Goal #1   Current Status SBA supine>sit  Min A with sit>supine with L LE   Goal #2 Patient is able to demonstrate transfers Sit to/from Stand at assistance level: minimal assistance     Goal #2  Current Status SBA with the RW   Goal #3 Patient is able to ambulate 100 feet with assistive device at assistance level: minimal assistance   Goal #3   Current Status 20' x2 with the RW SBA   Goal #4 Patient will negotiate 4 stairs/one curb w/ assistive device and moderate assistance   Goal #4   Current Status 4 stairs with 1 HR and also scooting on butt on descending stairs CGA   Goal #5 Patient verbalizes and/or demonstrates all precautions and safety concerns independently   Goal #5   Current Status iIN PROGRESS   Goal #6 Patient independently performs home exercise program for ROM/strengthening per the instructions provided in preparation for discharge.   Goal #6  Current Status IN PROGRESS               Therapeutic Activity: 30 minutes

## 2024-01-31 NOTE — CDS QUERY
CLINICAL DOCUMENTATION CLARIFICATION FORM    Dear Doctor Landry:  Clinical information (provided below) suggests a condition associated with or contributing to the fracture.   PLEASE (X) ALL THAT APPLY TO A FRACTURE INVOLVING:     (  ) Pathological Fracture  (  x) Traumatic Fracture Unrelated to another Condition   (  ) Other (please specify): ___________________________________      Clinical Indicators-  Calcium 9.4-8.6  CT L hip - : There is a comminuted nondisplaced fracture seen involving the base of the greater trochanter with extension of the fracture into the the posterior aspect of the femoral neck.  Risk Factors-  “We also discussed her osteoporosis and the risks of fractures”  Op report 1/25  Displaced fracture of left greater trochanter    Treatments-  IMN  Trend labs            If you have any questions, please contact Clinical :  Becky Rodriguez RN  BSN CDS  at [email or web address]. Thank You!  THIS FORM IS A PERMANENT PART OF THE MEDICAL RECORD

## (undated) DEVICE — SUTURE VCRL SZ 0 L27IN ABSRB VLT L36MM CT-1

## (undated) DEVICE — PACK CDS HIP PINNING

## (undated) DEVICE — SUTURE MCRYL SZ 3-0 L18IN ABSRB UD L19MM PS-2

## (undated) DEVICE — GUIDEWIRE ORTH L400MM DIA3.2MM FOR TFN

## (undated) DEVICE — BIT DRL L330MM DIA4.2MM CALIB 100MM 3 FLUT QC

## (undated) DEVICE — SUTURE VCRL SZ 2-0 L36IN ABSRB UD L36MM CT-1

## (undated) DEVICE — 3M™ TEGADERM™ TRANSPARENT FILM DRESSING, 1626W, 4 IN X 4-3/4 IN (10 CM X 12 CM), 50 EACH/CARTON, 4 CARTON/CASE: Brand: 3M™ TEGADERM™

## (undated) NOTE — LETTER
Date & Time: 1/24/2020, 11:41 AM  Patient: Herman Ruffin Rigor  Encounter Provider(s):    SIENNA Gray       To Whom It May Concern:    Andrew Hardwick was seen and treated in our department on 1/24/2020. She should not return to work until 1/27/20.     If

## (undated) NOTE — LETTER
[facility]  [address], [place], [place]  Authorization for Surgical Operation and Procedure                                                                                           I hereby authorize Narinder Ewing MD, my physician and his/her assistants (if applicable), which may include medical students, residents, and/or fellows, to perform the following surgical operation/ procedure and administer such anesthesia as may be determined necessary by my physician: Operation/Procedure name (s)    Left hip open reduction internal fixation    on Denisa Blount   2.   I recognize that during the surgical operation/procedure, unforeseen conditions may necessitate additional or different procedures than those listed above.  I, therefore, further authorize and request that the above-named surgeon, assistants, or designees perform such procedures as are, in their judgment, necessary and desirable.    3.   My surgeon/physician has discussed prior to my surgery the potential benefits, risks and side effects of this procedure; the likelihood of achieving goals; and potential problems that might occur during recuperation.  They also discussed reasonable alternatives to the procedure, including risks, benefits, and side effects related to the alternatives and risks related to not receiving this procedure.  I have had all my questions answered and I acknowledge that no guarantee has been made as to the result that may be obtained.    4.   Should the need arise during my operation/procedure, which includes change of level of care prior to discharge, I also consent to the administration of blood and/or blood products.  Further, I understand that despite careful testing and screening of blood or blood products by collecting agencies, I may still be subject to ill effects as a result of receiving a blood transfusion and/or blood products.  The following are some, but not all, of the potential risks that can  occur: fever and allergic reactions, hemolytic reactions, transmission of diseases such as Hepatitis, AIDS and Cytomegalovirus (CMV) and fluid overload.  In the event that I wish to have an autologous transfusion of my own blood, or a directed donor transfusion, I will discuss this with my physician.  Check only if Refusing Blood or Blood Products  I understand refusal of blood or blood products as deemed necessary by my physician may have serious consequences to my condition to include possible death. I hereby assume responsibility for my refusal and release the hospital, its personnel, and my physicians from any responsibility for the consequences of my refusal.    o  Refuse   5.   I authorize the use of any specimen, organs, tissues, body parts or foreign objects that may be removed from my body during the operation/procedure for diagnosis, research or teaching purposes and their subsequent disposal by hospital authorities.  I also authorize the release of specimen test results and/or written reports to my treating physician on the hospital medical staff or other referring or consulting physicians involved in my care, at the discretion of the Pathologist or my treating physician.    6.   I consent to the photographing or videotaping of the operations or procedures to be performed, including appropriate portions of my body for medical, scientific, or educational purposes, provided my identity is not revealed by the pictures or by descriptive texts accompanying them.  If the procedure has been photographed/videotaped, the surgeon will obtain the original picture, image, videotape or CD.  The hospital will not be responsible for storage, release or maintenance of the picture, image, tape or CD.    7.   I consent to the presence of a  or observers in the operating room as deemed necessary by my physician or their designees.    8.   I recognize that in the event my procedure results in extended  X-Ray/fluoroscopy time, I may develop a skin reaction.    9. If I have a Do Not Attempt Resuscitation (DNAR) order in place, that status will be suspended while in the operating room, procedural suite, and during the recovery period unless otherwise explicitly stated by me (or a person authorized to consent on my behalf). The surgeon or my attending physician will determine when the applicable recovery period ends for purposes of reinstating the DNAR order.  10. Patients having a sterilization procedure: I understand that if the procedure is successful the results will be permanent and it will therefore be impossible for me to inseminate, conceive, or bear children.  I also understand that the procedure is intended to result in sterility, although the result has not been guaranteed.   11. I acknowledge that my physician has explained sedation/analgesia administration to me including the risk and benefits I consent to the administration of sedation/analgesia as may be necessary or desirable in the judgment of my physician.    I CERTIFY THAT I HAVE READ AND FULLY UNDERSTAND THE ABOVE CONSENT TO OPERATION and/or OTHER PROCEDURE.     _________________________________________ _________________________________     ___________________________________  Signature of Patient     Signature of Responsible Person                   Printed Name of Responsible Person                              _________________________________________ ______________________________        ___________________________________  Signature of Witness         Date  Time         Relationship to Patient    STATEMENT OF PHYSICIAN My signature below affirms that prior to the time of the procedure; I have explained to the patient and/or his/her legal representative, the risks and benefits involved in the proposed treatment and any reasonable alternative to the proposed treatment. I have also explained the risks and benefits involved in refusal of the  proposed treatment and alternatives to the proposed treatment and have answered the patient's questions. If I have a significant financial interest in a co-management agreement or a significant financial interest in any product or implant, or other significant relationship used in this procedure/surgery, I have disclosed this and had a discussion with my patient.     _______________________________________________________________ _____________________________  (Signature of Physician)                                                                                         (Date)                                   (Time)  Patient Name: Denisa OWUSU Jose Alejandro    : 3/4/1965   Printed: 2024      Medical Record #: [id number]                                              Page 1 of 1